# Patient Record
Sex: FEMALE | Race: WHITE | Employment: OTHER | URBAN - METROPOLITAN AREA
[De-identification: names, ages, dates, MRNs, and addresses within clinical notes are randomized per-mention and may not be internally consistent; named-entity substitution may affect disease eponyms.]

---

## 2017-06-02 ENCOUNTER — HOSPITAL ENCOUNTER (OUTPATIENT)
Dept: PREADMISSION TESTING | Age: 72
Discharge: HOME | End: 2017-06-02
Attending: ORTHOPAEDIC SURGERY
Payer: COMMERCIAL

## 2017-06-02 DIAGNOSIS — M17.12 DEGENERATIVE ARTHRITIS OF LEFT KNEE: ICD-10-CM

## 2017-06-02 DIAGNOSIS — Z01.812 BLOOD TESTS PRIOR TO TREATMENT OR PROCEDURE: ICD-10-CM

## 2017-06-02 LAB
ALBUMIN SERPL BCP-MCNC: 4 G/DL (ref 3.4–5)
ALBUMIN/GLOB SERPL: 1.3 {RATIO} (ref 0.8–1.7)
ALP SERPL-CCNC: 71 U/L (ref 45–117)
ALT SERPL-CCNC: 41 U/L (ref 13–56)
ANION GAP BLD CALC-SCNC: 11 MMOL/L (ref 3–18)
APPEARANCE UR: CLEAR
APTT PPP: 32.6 SEC (ref 23–36.4)
AST SERPL W P-5'-P-CCNC: 24 U/L (ref 15–37)
ATRIAL RATE: 81 BPM
BACTERIA SPEC CULT: NORMAL
BACTERIA URNS QL MICRO: ABNORMAL /HPF
BASOPHILS # BLD AUTO: 0 K/UL (ref 0–0.06)
BASOPHILS # BLD: 0 % (ref 0–2)
BILIRUB SERPL-MCNC: 1.3 MG/DL (ref 0.2–1)
BILIRUB UR QL: NEGATIVE
BUN SERPL-MCNC: 16 MG/DL (ref 7–18)
BUN/CREAT SERPL: 18 (ref 12–20)
CALCIUM SERPL-MCNC: 9.2 MG/DL (ref 8.5–10.1)
CALCULATED P AXIS, ECG09: 44 DEGREES
CALCULATED R AXIS, ECG10: 56 DEGREES
CALCULATED T AXIS, ECG11: 48 DEGREES
CHLORIDE SERPL-SCNC: 103 MMOL/L (ref 100–108)
CO2 SERPL-SCNC: 26 MMOL/L (ref 21–32)
COLOR UR: YELLOW
CREAT SERPL-MCNC: 0.9 MG/DL (ref 0.6–1.3)
DIAGNOSIS, 93000: NORMAL
DIFFERENTIAL METHOD BLD: ABNORMAL
EOSINOPHIL # BLD: 0.2 K/UL (ref 0–0.4)
EOSINOPHIL NFR BLD: 2 % (ref 0–5)
EPITH CASTS URNS QL MICRO: ABNORMAL /LPF (ref 0–5)
ERYTHROCYTE [DISTWIDTH] IN BLOOD BY AUTOMATED COUNT: 13 % (ref 11.6–14.5)
ERYTHROCYTE [SEDIMENTATION RATE] IN BLOOD: 11 MM/HR (ref 0–30)
EST. AVERAGE GLUCOSE BLD GHB EST-MCNC: 114 MG/DL
GLOBULIN SER CALC-MCNC: 3.2 G/DL (ref 2–4)
GLUCOSE SERPL-MCNC: 122 MG/DL (ref 74–99)
GLUCOSE UR STRIP.AUTO-MCNC: NEGATIVE MG/DL
HBA1C MFR BLD: 5.6 % (ref 4.5–5.6)
HCT VFR BLD AUTO: 44.2 % (ref 35–45)
HGB BLD-MCNC: 15.3 G/DL (ref 12–16)
HGB UR QL STRIP: NEGATIVE
INR PPP: 1 (ref 0.8–1.2)
KETONES UR QL STRIP.AUTO: NEGATIVE MG/DL
LEUKOCYTE ESTERASE UR QL STRIP.AUTO: ABNORMAL
LYMPHOCYTES # BLD AUTO: 35 % (ref 21–52)
LYMPHOCYTES # BLD: 2.8 K/UL (ref 0.9–3.6)
MCH RBC QN AUTO: 30.7 PG (ref 24–34)
MCHC RBC AUTO-ENTMCNC: 34.6 G/DL (ref 31–37)
MCV RBC AUTO: 88.6 FL (ref 74–97)
MONOCYTES # BLD: 0.6 K/UL (ref 0.05–1.2)
MONOCYTES NFR BLD AUTO: 8 % (ref 3–10)
NEUTS SEG # BLD: 4.4 K/UL (ref 1.8–8)
NEUTS SEG NFR BLD AUTO: 55 % (ref 40–73)
NITRITE UR QL STRIP.AUTO: NEGATIVE
P-R INTERVAL, ECG05: 156 MS
PH UR STRIP: 7 [PH] (ref 5–8)
PLATELET # BLD AUTO: 303 K/UL (ref 135–420)
PMV BLD AUTO: 9 FL (ref 9.2–11.8)
POTASSIUM SERPL-SCNC: 4.3 MMOL/L (ref 3.5–5.5)
PROT SERPL-MCNC: 7.2 G/DL (ref 6.4–8.2)
PROT UR STRIP-MCNC: NEGATIVE MG/DL
PROTHROMBIN TIME: 12.5 SEC (ref 11.5–15.2)
Q-T INTERVAL, ECG07: 366 MS
QRS DURATION, ECG06: 86 MS
QTC CALCULATION (BEZET), ECG08: 425 MS
RBC # BLD AUTO: 4.99 M/UL (ref 4.2–5.3)
RBC #/AREA URNS HPF: ABNORMAL /HPF (ref 0–5)
SERVICE CMNT-IMP: NORMAL
SODIUM SERPL-SCNC: 140 MMOL/L (ref 136–145)
SP GR UR REFRACTOMETRY: 1.02 (ref 1–1.03)
UROBILINOGEN UR QL STRIP.AUTO: 1 EU/DL (ref 0.2–1)
VENTRICULAR RATE, ECG03: 81 BPM
WBC # BLD AUTO: 8 K/UL (ref 4.6–13.2)
WBC URNS QL MICRO: ABNORMAL /HPF (ref 0–5)

## 2017-06-02 PROCEDURE — 80053 COMPREHEN METABOLIC PANEL: CPT | Performed by: ORTHOPAEDIC SURGERY

## 2017-06-02 PROCEDURE — 87641 MR-STAPH DNA AMP PROBE: CPT | Performed by: ORTHOPAEDIC SURGERY

## 2017-06-02 PROCEDURE — 83036 HEMOGLOBIN GLYCOSYLATED A1C: CPT | Performed by: ORTHOPAEDIC SURGERY

## 2017-06-02 PROCEDURE — 93005 ELECTROCARDIOGRAM TRACING: CPT

## 2017-06-02 PROCEDURE — 36415 COLL VENOUS BLD VENIPUNCTURE: CPT | Performed by: ORTHOPAEDIC SURGERY

## 2017-06-02 PROCEDURE — 85652 RBC SED RATE AUTOMATED: CPT | Performed by: ORTHOPAEDIC SURGERY

## 2017-06-02 PROCEDURE — 85730 THROMBOPLASTIN TIME PARTIAL: CPT | Performed by: ORTHOPAEDIC SURGERY

## 2017-06-02 PROCEDURE — 81001 URINALYSIS AUTO W/SCOPE: CPT | Performed by: ORTHOPAEDIC SURGERY

## 2017-06-02 PROCEDURE — 85025 COMPLETE CBC W/AUTO DIFF WBC: CPT | Performed by: ORTHOPAEDIC SURGERY

## 2017-06-02 PROCEDURE — 87086 URINE CULTURE/COLONY COUNT: CPT | Performed by: ORTHOPAEDIC SURGERY

## 2017-06-02 PROCEDURE — 85610 PROTHROMBIN TIME: CPT | Performed by: ORTHOPAEDIC SURGERY

## 2017-06-03 LAB
BACTERIA SPEC CULT: NORMAL
SERVICE CMNT-IMP: NORMAL

## 2017-06-25 NOTE — H&P
[address] [facility] [address]  History and Physical Exam    Patient: Es Schafer MRN: [id number]  SSN: [id number]    YOB: 1945  Age: 66 y.o. Sex: female      Subjective:      Chief Complaint: left knee pain    History of Present Illness:  Patient complains of left knee pain and difficulty ambulating. Past Medical History:   Diagnosis Date    Adverse effect of anesthesia     slow to wake up    Arthritis     Hypertension 2002    Ill-defined condition     cholesterol    Nausea & vomiting     Osteoarthritis of right hip 8/17/2014     Past Surgical History:   Procedure Laterality Date    HX HYSTERECTOMY      partial    TOTAL HIP ARTHROPLASTY  2009    left    TOTAL HIP ARTHROPLASTY Right 2014     Social History     Occupational History    Not on file. Social History Main Topics    Smoking status: Never Smoker    Smokeless tobacco: Never Used    Alcohol use 0.5 oz/week     1 drink(s) per week      Comment: 2 per month    Drug use: No    Sexual activity: Not on file     Prior to Admission medications    Medication Sig Start Date End Date Taking? Authorizing Provider   ibuprofen (ADVIL) 200 mg tablet Take 200 mg by mouth every six (6) hours as needed for Pain. Historical Provider   valsartan-hydrochlorothiazide (DIOVAN HCT) 320-25 mg per tablet Take 1 Tab by mouth daily. Historical Provider   atorvastatin (LIPITOR) 20 mg tablet Take 20 mg by mouth nightly. Historical Provider     Family History: osteoarthritis and hypertension     Allergies: No Known Allergies     Review of Systems:  A comprehensive review of systems was negative except for that written in the History of Present Illness.     Objective:       Physical Exam:  HEENT: Normocephalic, atraumatic  Lungs:  Clear to auscultation  Heart:   Regular rate and rhythm  Abdomen: Soft  Extremities:  Pain with range of motion of the left knee  Neurological: Grossly neurovascularly intact    Assessment: Arthritis of the left knee. Plan:       The patient has failed previous efforts of conservative management to include cortisone injections and viscosupplementation. Due to the fact that conservative efforts failed, the patient became a candidate for surgical intervention. Proceed with scheduled left total knee arthroplasty. The various methods of treatment have been discussed with the patient and family. After consideration of risks, benefits, and other options for treatment, the patient has consented to surgical interventions. Questions were answered and preoperative teaching was done by Dr. Toy Louis.      Signed By: Gabriel Ibarra PA-C     June 25, 2017

## 2017-06-26 ENCOUNTER — APPOINTMENT (OUTPATIENT)
Dept: GENERAL RADIOLOGY | Age: 72
DRG: 470 | End: 2017-06-26
Attending: PHYSICIAN ASSISTANT
Payer: COMMERCIAL

## 2017-06-26 ENCOUNTER — ANESTHESIA (OUTPATIENT)
Dept: SURGERY | Age: 72
DRG: 470 | End: 2017-06-26
Payer: COMMERCIAL

## 2017-06-26 ENCOUNTER — ANESTHESIA EVENT (OUTPATIENT)
Dept: SURGERY | Age: 72
DRG: 470 | End: 2017-06-26
Payer: COMMERCIAL

## 2017-06-26 ENCOUNTER — HOSPITAL ENCOUNTER (INPATIENT)
Age: 72
LOS: 1 days | Discharge: HOME HEALTH | DRG: 470 | End: 2017-06-27
Attending: ORTHOPAEDIC SURGERY | Admitting: ORTHOPAEDIC SURGERY
Payer: COMMERCIAL

## 2017-06-26 PROBLEM — M17.12 OSTEOARTHRITIS OF LEFT KNEE: Status: ACTIVE | Noted: 2017-06-26

## 2017-06-26 LAB
ABO + RH BLD: NORMAL
BLOOD GROUP ANTIBODIES SERPL: NORMAL
SPECIMEN EXP DATE BLD: NORMAL

## 2017-06-26 PROCEDURE — 76060000035 HC ANESTHESIA 2 TO 2.5 HR: Performed by: ORTHOPAEDIC SURGERY

## 2017-06-26 PROCEDURE — 0SRD0JZ REPLACEMENT OF LEFT KNEE JOINT WITH SYNTHETIC SUBSTITUTE, OPEN APPROACH: ICD-10-PCS | Performed by: ORTHOPAEDIC SURGERY

## 2017-06-26 PROCEDURE — 86900 BLOOD TYPING SEROLOGIC ABO: CPT | Performed by: ORTHOPAEDIC SURGERY

## 2017-06-26 PROCEDURE — 77030011640 HC PAD GRND REM COVD -A: Performed by: ORTHOPAEDIC SURGERY

## 2017-06-26 PROCEDURE — 77010033678 HC OXYGEN DAILY

## 2017-06-26 PROCEDURE — 77030020813 HC INST SCULP CEM KT DISP S&N -B: Performed by: ORTHOPAEDIC SURGERY

## 2017-06-26 PROCEDURE — 74011000250 HC RX REV CODE- 250

## 2017-06-26 PROCEDURE — 77030031139 HC SUT VCRL2 J&J -A: Performed by: ORTHOPAEDIC SURGERY

## 2017-06-26 PROCEDURE — 77030000032 HC CUF TRNQT ZIMM -B: Performed by: ORTHOPAEDIC SURGERY

## 2017-06-26 PROCEDURE — 74011250636 HC RX REV CODE- 250/636: Performed by: ORTHOPAEDIC SURGERY

## 2017-06-26 PROCEDURE — 64447 NJX AA&/STRD FEMORAL NRV IMG: CPT | Performed by: ANESTHESIOLOGY

## 2017-06-26 PROCEDURE — 77030033067 HC SUT PDO STRATFX SPIR J&J -B: Performed by: ORTHOPAEDIC SURGERY

## 2017-06-26 PROCEDURE — 77030003666 HC NDL SPINAL BD -A: Performed by: ORTHOPAEDIC SURGERY

## 2017-06-26 PROCEDURE — 77030010785: Performed by: ORTHOPAEDIC SURGERY

## 2017-06-26 PROCEDURE — 97161 PT EVAL LOW COMPLEX 20 MIN: CPT

## 2017-06-26 PROCEDURE — 74011250636 HC RX REV CODE- 250/636

## 2017-06-26 PROCEDURE — 77030037875 HC DRSG MEPILEX <16IN BORD MOLN -A: Performed by: ORTHOPAEDIC SURGERY

## 2017-06-26 PROCEDURE — 77030018846 HC SOL IRR STRL H20 ICUM -A: Performed by: ORTHOPAEDIC SURGERY

## 2017-06-26 PROCEDURE — 36415 COLL VENOUS BLD VENIPUNCTURE: CPT | Performed by: ORTHOPAEDIC SURGERY

## 2017-06-26 PROCEDURE — 74011000258 HC RX REV CODE- 258

## 2017-06-26 PROCEDURE — 77030018836 HC SOL IRR NACL ICUM -A: Performed by: ORTHOPAEDIC SURGERY

## 2017-06-26 PROCEDURE — 76942 ECHO GUIDE FOR BIOPSY: CPT | Performed by: ANESTHESIOLOGY

## 2017-06-26 PROCEDURE — 77030032489 HC SLV COMPR SCD FT CUF COVD -B: Performed by: ORTHOPAEDIC SURGERY

## 2017-06-26 PROCEDURE — 74011000258 HC RX REV CODE- 258: Performed by: PHYSICIAN ASSISTANT

## 2017-06-26 PROCEDURE — 76010000131 HC OR TIME 2 TO 2.5 HR: Performed by: ORTHOPAEDIC SURGERY

## 2017-06-26 PROCEDURE — 77030011264 HC ELECTRD BLD EXT COVD -A: Performed by: ORTHOPAEDIC SURGERY

## 2017-06-26 PROCEDURE — 77030018835 HC SOL IRR LR ICUM -A: Performed by: ORTHOPAEDIC SURGERY

## 2017-06-26 PROCEDURE — 74011000250 HC RX REV CODE- 250: Performed by: ORTHOPAEDIC SURGERY

## 2017-06-26 PROCEDURE — 74011250637 HC RX REV CODE- 250/637: Performed by: ANESTHESIOLOGY

## 2017-06-26 PROCEDURE — 77030016060 HC NDL NRV BLK TELE -A: Performed by: ANESTHESIOLOGY

## 2017-06-26 PROCEDURE — 74011000258 HC RX REV CODE- 258: Performed by: ORTHOPAEDIC SURGERY

## 2017-06-26 PROCEDURE — 77030013708 HC HNDPC SUC IRR PULS STRY –B: Performed by: ORTHOPAEDIC SURGERY

## 2017-06-26 PROCEDURE — 65270000029 HC RM PRIVATE

## 2017-06-26 PROCEDURE — 74011250637 HC RX REV CODE- 250/637: Performed by: PHYSICIAN ASSISTANT

## 2017-06-26 PROCEDURE — 77030020782 HC GWN BAIR PAWS FLX 3M -B: Performed by: ORTHOPAEDIC SURGERY

## 2017-06-26 PROCEDURE — 77030036563 HC WRP CLD THER KNE S2SG -B: Performed by: ORTHOPAEDIC SURGERY

## 2017-06-26 PROCEDURE — 77030034479 HC ADH SKN CLSR PRINEO J&J -B: Performed by: ORTHOPAEDIC SURGERY

## 2017-06-26 PROCEDURE — 76210000006 HC OR PH I REC 0.5 TO 1 HR: Performed by: ORTHOPAEDIC SURGERY

## 2017-06-26 PROCEDURE — 77030035643 HC BLD SAW OSC PRECIS STRY -C: Performed by: ORTHOPAEDIC SURGERY

## 2017-06-26 PROCEDURE — C1776 JOINT DEVICE (IMPLANTABLE): HCPCS | Performed by: ORTHOPAEDIC SURGERY

## 2017-06-26 PROCEDURE — 74011250636 HC RX REV CODE- 250/636: Performed by: PHYSICIAN ASSISTANT

## 2017-06-26 PROCEDURE — 3E0T3CZ INTRODUCE REGIONAL ANESTH IN PERIPH NRV, PLEXI, PERC: ICD-10-PCS | Performed by: ANESTHESIOLOGY

## 2017-06-26 PROCEDURE — 74011000250 HC RX REV CODE- 250: Performed by: PHYSICIAN ASSISTANT

## 2017-06-26 PROCEDURE — 73560 X-RAY EXAM OF KNEE 1 OR 2: CPT

## 2017-06-26 PROCEDURE — 77030012508 HC MSK AIRWY LMA AMBU -A: Performed by: ANESTHESIOLOGY

## 2017-06-26 PROCEDURE — 74011250636 HC RX REV CODE- 250/636: Performed by: ANESTHESIOLOGY

## 2017-06-26 PROCEDURE — C9290 INJ, BUPIVACAINE LIPOSOME: HCPCS | Performed by: ORTHOPAEDIC SURGERY

## 2017-06-26 PROCEDURE — 77030002933 HC SUT MCRYL J&J -A: Performed by: ORTHOPAEDIC SURGERY

## 2017-06-26 DEVICE — PAT ASYM MTL-BK 11MM SZ A38 -- TRIATHLON: Type: IMPLANTABLE DEVICE | Site: KNEE | Status: FUNCTIONAL

## 2017-06-26 DEVICE — COMPONENT TOT KNEE HYBRID POROUS X3 TRIATHLON: Type: IMPLANTABLE DEVICE | Site: KNEE | Status: FUNCTIONAL

## 2017-06-26 DEVICE — INSERT TIB ARTC BRNG SZ 4 9MM -- TRIATHLON X3: Type: IMPLANTABLE DEVICE | Site: KNEE | Status: FUNCTIONAL

## 2017-06-26 DEVICE — COMPNT FEM CR TRIATHLN 4 L PA --: Type: IMPLANTABLE DEVICE | Site: KNEE | Status: FUNCTIONAL

## 2017-06-26 DEVICE — BASEPLATE TIB SZ 4 AP46MM ML70MM KNEE TRITANIUM 4 CRUCFRM: Type: IMPLANTABLE DEVICE | Site: KNEE | Status: FUNCTIONAL

## 2017-06-26 RX ORDER — CEFAZOLIN SODIUM 2 G/50ML
2 SOLUTION INTRAVENOUS ONCE
Status: COMPLETED | OUTPATIENT
Start: 2017-06-26 | End: 2017-06-26

## 2017-06-26 RX ORDER — SODIUM CHLORIDE 0.9 % (FLUSH) 0.9 %
5-10 SYRINGE (ML) INJECTION AS NEEDED
Status: DISCONTINUED | OUTPATIENT
Start: 2017-06-26 | End: 2017-06-26 | Stop reason: HOSPADM

## 2017-06-26 RX ORDER — ACETAMINOPHEN 325 MG/1
650 TABLET ORAL EVERY 6 HOURS
Status: DISCONTINUED | OUTPATIENT
Start: 2017-06-27 | End: 2017-06-27 | Stop reason: HOSPADM

## 2017-06-26 RX ORDER — HYDROMORPHONE HYDROCHLORIDE 1 MG/ML
0.5 INJECTION, SOLUTION INTRAMUSCULAR; INTRAVENOUS; SUBCUTANEOUS
Status: DISCONTINUED | OUTPATIENT
Start: 2017-06-26 | End: 2017-06-26 | Stop reason: HOSPADM

## 2017-06-26 RX ORDER — OXYCODONE HYDROCHLORIDE 5 MG/1
5-10 TABLET ORAL
Status: DISCONTINUED | OUTPATIENT
Start: 2017-06-26 | End: 2017-06-27 | Stop reason: HOSPADM

## 2017-06-26 RX ORDER — ATORVASTATIN CALCIUM 20 MG/1
20 TABLET, FILM COATED ORAL
Status: DISCONTINUED | OUTPATIENT
Start: 2017-06-26 | End: 2017-06-27 | Stop reason: HOSPADM

## 2017-06-26 RX ORDER — ONDANSETRON 2 MG/ML
INJECTION INTRAMUSCULAR; INTRAVENOUS AS NEEDED
Status: DISCONTINUED | OUTPATIENT
Start: 2017-06-26 | End: 2017-06-26 | Stop reason: HOSPADM

## 2017-06-26 RX ORDER — OXYCODONE HYDROCHLORIDE 5 MG/1
5 TABLET ORAL ONCE
Status: DISCONTINUED | OUTPATIENT
Start: 2017-06-26 | End: 2017-06-26

## 2017-06-26 RX ORDER — FENTANYL CITRATE 50 UG/ML
INJECTION, SOLUTION INTRAMUSCULAR; INTRAVENOUS AS NEEDED
Status: DISCONTINUED | OUTPATIENT
Start: 2017-06-26 | End: 2017-06-26 | Stop reason: HOSPADM

## 2017-06-26 RX ORDER — SODIUM CHLORIDE, SODIUM LACTATE, POTASSIUM CHLORIDE, CALCIUM CHLORIDE 600; 310; 30; 20 MG/100ML; MG/100ML; MG/100ML; MG/100ML
75 INJECTION, SOLUTION INTRAVENOUS CONTINUOUS
Status: DISCONTINUED | OUTPATIENT
Start: 2017-06-26 | End: 2017-06-27 | Stop reason: HOSPADM

## 2017-06-26 RX ORDER — SODIUM CHLORIDE, SODIUM LACTATE, POTASSIUM CHLORIDE, CALCIUM CHLORIDE 600; 310; 30; 20 MG/100ML; MG/100ML; MG/100ML; MG/100ML
125 INJECTION, SOLUTION INTRAVENOUS CONTINUOUS
Status: DISCONTINUED | OUTPATIENT
Start: 2017-06-26 | End: 2017-06-26 | Stop reason: HOSPADM

## 2017-06-26 RX ORDER — HYDROMORPHONE HYDROCHLORIDE 1 MG/ML
INJECTION, SOLUTION INTRAMUSCULAR; INTRAVENOUS; SUBCUTANEOUS AS NEEDED
Status: DISCONTINUED | OUTPATIENT
Start: 2017-06-26 | End: 2017-06-26 | Stop reason: HOSPADM

## 2017-06-26 RX ORDER — PROPOFOL 10 MG/ML
INJECTION, EMULSION INTRAVENOUS AS NEEDED
Status: DISCONTINUED | OUTPATIENT
Start: 2017-06-26 | End: 2017-06-26 | Stop reason: HOSPADM

## 2017-06-26 RX ORDER — DOCUSATE SODIUM 100 MG/1
100 CAPSULE, LIQUID FILLED ORAL 2 TIMES DAILY
Status: DISCONTINUED | OUTPATIENT
Start: 2017-06-26 | End: 2017-06-27 | Stop reason: HOSPADM

## 2017-06-26 RX ORDER — GLYCOPYRROLATE 0.2 MG/ML
INJECTION INTRAMUSCULAR; INTRAVENOUS AS NEEDED
Status: DISCONTINUED | OUTPATIENT
Start: 2017-06-26 | End: 2017-06-26 | Stop reason: HOSPADM

## 2017-06-26 RX ORDER — ROPIVACAINE HYDROCHLORIDE 5 MG/ML
INJECTION, SOLUTION EPIDURAL; INFILTRATION; PERINEURAL AS NEEDED
Status: DISCONTINUED | OUTPATIENT
Start: 2017-06-26 | End: 2017-06-26 | Stop reason: HOSPADM

## 2017-06-26 RX ORDER — SODIUM CHLORIDE 0.9 % (FLUSH) 0.9 %
5-10 SYRINGE (ML) INJECTION AS NEEDED
Status: DISCONTINUED | OUTPATIENT
Start: 2017-06-26 | End: 2017-06-27 | Stop reason: HOSPADM

## 2017-06-26 RX ORDER — LORAZEPAM 2 MG/ML
1 INJECTION INTRAMUSCULAR
Status: DISCONTINUED | OUTPATIENT
Start: 2017-06-26 | End: 2017-06-27 | Stop reason: HOSPADM

## 2017-06-26 RX ORDER — ACETAMINOPHEN 10 MG/ML
1000 INJECTION, SOLUTION INTRAVENOUS ONCE
Status: COMPLETED | OUTPATIENT
Start: 2017-06-26 | End: 2017-06-26

## 2017-06-26 RX ORDER — CELECOXIB 100 MG/1
400 CAPSULE ORAL
Status: COMPLETED | OUTPATIENT
Start: 2017-06-26 | End: 2017-06-26

## 2017-06-26 RX ORDER — EPHEDRINE SULFATE/0.9% NACL/PF 25 MG/5 ML
SYRINGE (ML) INTRAVENOUS AS NEEDED
Status: DISCONTINUED | OUTPATIENT
Start: 2017-06-26 | End: 2017-06-26 | Stop reason: HOSPADM

## 2017-06-26 RX ORDER — HYDROMORPHONE HYDROCHLORIDE 1 MG/ML
1 INJECTION, SOLUTION INTRAMUSCULAR; INTRAVENOUS; SUBCUTANEOUS
Status: DISCONTINUED | OUTPATIENT
Start: 2017-06-26 | End: 2017-06-27 | Stop reason: HOSPADM

## 2017-06-26 RX ORDER — ONDANSETRON 2 MG/ML
INJECTION INTRAMUSCULAR; INTRAVENOUS
Status: COMPLETED
Start: 2017-06-26 | End: 2017-06-26

## 2017-06-26 RX ORDER — ONDANSETRON 2 MG/ML
4 INJECTION INTRAMUSCULAR; INTRAVENOUS
Status: DISCONTINUED | OUTPATIENT
Start: 2017-06-26 | End: 2017-06-27 | Stop reason: HOSPADM

## 2017-06-26 RX ORDER — SODIUM CHLORIDE 0.9 % (FLUSH) 0.9 %
5-10 SYRINGE (ML) INJECTION EVERY 8 HOURS
Status: DISCONTINUED | OUTPATIENT
Start: 2017-06-26 | End: 2017-06-27 | Stop reason: HOSPADM

## 2017-06-26 RX ORDER — MIDAZOLAM HYDROCHLORIDE 1 MG/ML
INJECTION, SOLUTION INTRAMUSCULAR; INTRAVENOUS AS NEEDED
Status: DISCONTINUED | OUTPATIENT
Start: 2017-06-26 | End: 2017-06-26 | Stop reason: HOSPADM

## 2017-06-26 RX ORDER — ACETAMINOPHEN 10 MG/ML
1000 INJECTION, SOLUTION INTRAVENOUS EVERY 8 HOURS
Status: COMPLETED | OUTPATIENT
Start: 2017-06-26 | End: 2017-06-27

## 2017-06-26 RX ORDER — SODIUM CHLORIDE, SODIUM LACTATE, POTASSIUM CHLORIDE, CALCIUM CHLORIDE 600; 310; 30; 20 MG/100ML; MG/100ML; MG/100ML; MG/100ML
125 INJECTION, SOLUTION INTRAVENOUS CONTINUOUS
Status: DISCONTINUED | OUTPATIENT
Start: 2017-06-26 | End: 2017-06-27 | Stop reason: HOSPADM

## 2017-06-26 RX ORDER — NALOXONE HYDROCHLORIDE 0.4 MG/ML
0.4 INJECTION, SOLUTION INTRAMUSCULAR; INTRAVENOUS; SUBCUTANEOUS AS NEEDED
Status: DISCONTINUED | OUTPATIENT
Start: 2017-06-26 | End: 2017-06-27 | Stop reason: HOSPADM

## 2017-06-26 RX ORDER — VALSARTAN 160 MG/1
320 TABLET ORAL DAILY
Status: DISCONTINUED | OUTPATIENT
Start: 2017-06-27 | End: 2017-06-27 | Stop reason: HOSPADM

## 2017-06-26 RX ORDER — HYDROCHLOROTHIAZIDE 25 MG/1
25 TABLET ORAL DAILY
Status: DISCONTINUED | OUTPATIENT
Start: 2017-06-27 | End: 2017-06-27 | Stop reason: HOSPADM

## 2017-06-26 RX ORDER — LIDOCAINE HYDROCHLORIDE 20 MG/ML
INJECTION, SOLUTION EPIDURAL; INFILTRATION; INTRACAUDAL; PERINEURAL AS NEEDED
Status: DISCONTINUED | OUTPATIENT
Start: 2017-06-26 | End: 2017-06-26 | Stop reason: HOSPADM

## 2017-06-26 RX ORDER — LANOLIN ALCOHOL/MO/W.PET/CERES
1 CREAM (GRAM) TOPICAL 3 TIMES DAILY
Status: DISCONTINUED | OUTPATIENT
Start: 2017-06-26 | End: 2017-06-27 | Stop reason: HOSPADM

## 2017-06-26 RX ORDER — CEFAZOLIN SODIUM 2 G/50ML
2 SOLUTION INTRAVENOUS EVERY 8 HOURS
Status: COMPLETED | OUTPATIENT
Start: 2017-06-26 | End: 2017-06-27

## 2017-06-26 RX ORDER — PREGABALIN 75 MG/1
75 CAPSULE ORAL
Status: COMPLETED | OUTPATIENT
Start: 2017-06-26 | End: 2017-06-26

## 2017-06-26 RX ORDER — DIPHENHYDRAMINE HCL 25 MG
25 CAPSULE ORAL
Status: DISCONTINUED | OUTPATIENT
Start: 2017-06-26 | End: 2017-06-27 | Stop reason: HOSPADM

## 2017-06-26 RX ORDER — ASPIRIN 81 MG/1
81 TABLET ORAL 2 TIMES DAILY
Status: DISCONTINUED | OUTPATIENT
Start: 2017-06-26 | End: 2017-06-27 | Stop reason: HOSPADM

## 2017-06-26 RX ADMIN — CEFAZOLIN SODIUM 2 G: 2 SOLUTION INTRAVENOUS at 22:09

## 2017-06-26 RX ADMIN — FENTANYL CITRATE 25 MCG: 50 INJECTION, SOLUTION INTRAMUSCULAR; INTRAVENOUS at 14:14

## 2017-06-26 RX ADMIN — FENTANYL CITRATE 25 MCG: 50 INJECTION, SOLUTION INTRAMUSCULAR; INTRAVENOUS at 14:09

## 2017-06-26 RX ADMIN — ASPIRIN 81 MG: 81 TABLET, COATED ORAL at 21:16

## 2017-06-26 RX ADMIN — FENTANYL CITRATE 100 MCG: 50 INJECTION, SOLUTION INTRAMUSCULAR; INTRAVENOUS at 12:35

## 2017-06-26 RX ADMIN — ACETAMINOPHEN 1000 MG: 10 INJECTION, SOLUTION INTRAVENOUS at 14:06

## 2017-06-26 RX ADMIN — MIDAZOLAM HYDROCHLORIDE 2 MG: 1 INJECTION, SOLUTION INTRAMUSCULAR; INTRAVENOUS at 13:37

## 2017-06-26 RX ADMIN — PREGABALIN 75 MG: 75 CAPSULE ORAL at 12:09

## 2017-06-26 RX ADMIN — FENTANYL CITRATE 25 MCG: 50 INJECTION, SOLUTION INTRAMUSCULAR; INTRAVENOUS at 14:37

## 2017-06-26 RX ADMIN — HYDROMORPHONE HYDROCHLORIDE 0.2 MG: 1 INJECTION, SOLUTION INTRAMUSCULAR; INTRAVENOUS; SUBCUTANEOUS at 15:58

## 2017-06-26 RX ADMIN — CEFAZOLIN SODIUM 2 G: 2 SOLUTION INTRAVENOUS at 13:59

## 2017-06-26 RX ADMIN — SODIUM CHLORIDE 1 G: 900 INJECTION, SOLUTION INTRAVENOUS at 13:50

## 2017-06-26 RX ADMIN — ONDANSETRON 4 MG: 2 INJECTION INTRAMUSCULAR; INTRAVENOUS at 13:40

## 2017-06-26 RX ADMIN — PROPOFOL 150 MG: 10 INJECTION, EMULSION INTRAVENOUS at 13:43

## 2017-06-26 RX ADMIN — HYDROMORPHONE HYDROCHLORIDE 0.2 MG: 1 INJECTION, SOLUTION INTRAMUSCULAR; INTRAVENOUS; SUBCUTANEOUS at 15:32

## 2017-06-26 RX ADMIN — ONDANSETRON HYDROCHLORIDE 4 MG: 2 INJECTION INTRAMUSCULAR; INTRAVENOUS at 17:44

## 2017-06-26 RX ADMIN — FENTANYL CITRATE 50 MCG: 50 INJECTION, SOLUTION INTRAMUSCULAR; INTRAVENOUS at 15:38

## 2017-06-26 RX ADMIN — FENTANYL CITRATE 25 MCG: 50 INJECTION, SOLUTION INTRAMUSCULAR; INTRAVENOUS at 14:20

## 2017-06-26 RX ADMIN — SODIUM CHLORIDE, SODIUM LACTATE, POTASSIUM CHLORIDE, AND CALCIUM CHLORIDE: 600; 310; 30; 20 INJECTION, SOLUTION INTRAVENOUS at 15:52

## 2017-06-26 RX ADMIN — GLYCOPYRROLATE 0.2 MG: 0.2 INJECTION INTRAMUSCULAR; INTRAVENOUS at 13:40

## 2017-06-26 RX ADMIN — SODIUM CHLORIDE, SODIUM LACTATE, POTASSIUM CHLORIDE, AND CALCIUM CHLORIDE: 600; 310; 30; 20 INJECTION, SOLUTION INTRAVENOUS at 14:21

## 2017-06-26 RX ADMIN — SODIUM CHLORIDE 1 G: 900 INJECTION, SOLUTION INTRAVENOUS at 15:18

## 2017-06-26 RX ADMIN — Medication 5 MG: at 15:45

## 2017-06-26 RX ADMIN — LIDOCAINE HYDROCHLORIDE 60 MG: 20 INJECTION, SOLUTION EPIDURAL; INFILTRATION; INTRACAUDAL; PERINEURAL at 13:43

## 2017-06-26 RX ADMIN — ATORVASTATIN CALCIUM 20 MG: 20 TABLET, FILM COATED ORAL at 21:15

## 2017-06-26 RX ADMIN — ONDANSETRON 4 MG: 2 INJECTION INTRAMUSCULAR; INTRAVENOUS at 22:10

## 2017-06-26 RX ADMIN — DOCUSATE SODIUM 100 MG: 100 CAPSULE, LIQUID FILLED ORAL at 21:16

## 2017-06-26 RX ADMIN — ROPIVACAINE HYDROCHLORIDE 20 ML: 5 INJECTION, SOLUTION EPIDURAL; INFILTRATION; PERINEURAL at 12:40

## 2017-06-26 RX ADMIN — CELECOXIB 400 MG: 100 CAPSULE ORAL at 12:09

## 2017-06-26 RX ADMIN — Medication 10 MG: at 13:58

## 2017-06-26 RX ADMIN — HYDROMORPHONE HYDROCHLORIDE 0.3 MG: 1 INJECTION, SOLUTION INTRAMUSCULAR; INTRAVENOUS; SUBCUTANEOUS at 15:51

## 2017-06-26 RX ADMIN — HYDROMORPHONE HYDROCHLORIDE 0.3 MG: 1 INJECTION, SOLUTION INTRAMUSCULAR; INTRAVENOUS; SUBCUTANEOUS at 16:07

## 2017-06-26 RX ADMIN — FENTANYL CITRATE 25 MCG: 50 INJECTION, SOLUTION INTRAMUSCULAR; INTRAVENOUS at 14:01

## 2017-06-26 RX ADMIN — MIDAZOLAM HYDROCHLORIDE 2 MG: 1 INJECTION, SOLUTION INTRAMUSCULAR; INTRAVENOUS at 12:35

## 2017-06-26 RX ADMIN — SODIUM CHLORIDE, SODIUM LACTATE, POTASSIUM CHLORIDE, AND CALCIUM CHLORIDE 75 ML/HR: 600; 310; 30; 20 INJECTION, SOLUTION INTRAVENOUS at 19:11

## 2017-06-26 RX ADMIN — Medication 10 MG: at 13:50

## 2017-06-26 RX ADMIN — SODIUM CHLORIDE, SODIUM LACTATE, POTASSIUM CHLORIDE, AND CALCIUM CHLORIDE 125 ML/HR: 600; 310; 30; 20 INJECTION, SOLUTION INTRAVENOUS at 09:51

## 2017-06-26 RX ADMIN — Medication 5 MG: at 14:02

## 2017-06-26 RX ADMIN — FENTANYL CITRATE 25 MCG: 50 INJECTION, SOLUTION INTRAMUSCULAR; INTRAVENOUS at 14:27

## 2017-06-26 RX ADMIN — FERROUS SULFATE TAB 325 MG (65 MG ELEMENTAL FE) 325 MG: 325 (65 FE) TAB at 21:16

## 2017-06-26 RX ADMIN — FENTANYL CITRATE 25 MCG: 50 INJECTION, SOLUTION INTRAMUSCULAR; INTRAVENOUS at 15:07

## 2017-06-26 RX ADMIN — Medication 5 MG: at 14:58

## 2017-06-26 RX ADMIN — FENTANYL CITRATE 25 MCG: 50 INJECTION, SOLUTION INTRAMUSCULAR; INTRAVENOUS at 14:32

## 2017-06-26 RX ADMIN — ACETAMINOPHEN 1000 MG: 10 INJECTION, SOLUTION INTRAVENOUS at 21:14

## 2017-06-26 NOTE — ANESTHESIA PREPROCEDURE EVALUATION
Anesthetic History     PONV          Review of Systems / Medical History  Patient summary reviewed, nursing notes reviewed and pertinent labs reviewed    Pulmonary  Within defined limits                 Neuro/Psych   Within defined limits           Cardiovascular    Hypertension              Exercise tolerance: >4 METS     GI/Hepatic/Renal  Within defined limits              Endo/Other        Arthritis     Other Findings              Physical Exam    Airway  Mallampati: III  TM Distance: 4 - 6 cm  Neck ROM: normal range of motion   Mouth opening: Normal     Cardiovascular  Regular rate and rhythm,  S1 and S2 normal,  no murmur, click, rub, or gallop  Rhythm: regular  Rate: normal         Dental    Dentition: Full upper dentures and Full lower dentures     Pulmonary  Breath sounds clear to auscultation               Abdominal  GI exam deferred       Other Findings            Anesthetic Plan    ASA: 3  Anesthesia type: general      Post-op pain plan if not by surgeon: peripheral nerve block single    Induction: Intravenous  Anesthetic plan and risks discussed with: Patient and Spouse

## 2017-06-26 NOTE — ROUTINE PROCESS
TRANSFER - IN REPORT:    Verbal report received from Jade Osuna RN(name) on Judith Kang  being received from PACU(unit) for routine post - op      Report consisted of patients Situation, Background, Assessment and   Recommendations(SBAR). Information from the following report(s) SBAR, Kardex, OR Summary, Procedure Summary, Intake/Output, MAR, Med Rec Status and Cardiac Rhythm NSR was reviewed with the receiving nurse. Opportunity for questions and clarification was provided.

## 2017-06-26 NOTE — OP NOTES
[address] [address]   Total Left Knee Arthroplasty          Date of Surgery: 6/26/2017   Preoperative Diagnosis: OSTEOARTHRITIS LEFT KNEE   Postoperative Diagnosis: OSTEOARTHRITIS LEFT KNEE   Location: [facility]  Surgeon: Nishant Escobar MD  Assistant:   Nacho ZULUAGA  Anesthesia: General and Adductor Canal Block    Procedure: Total Left Knee Arthroplasty    Findings:  Degenerative joint disease of the left knee. Estimated Blood Loss:  150 cc    Specimens: None    Implants:   Implant Name Type Inv. Item Serial No.  Lot No. LRB No. Used Action   BASEPLT TIB PC TRITNM SZ 4 -- TRIATHLON - [id number]  BASEPLT TIB PC TRITNM SZ 4 -- TRIATHLON  [employer] HOW [id number] Left 1 Implanted   INSERT TIB ARTC BRNG SZ 4 9MM -- TRIATHLON X3 - [id number]  INSERT TIB ARTC BRNG SZ 4 9MM -- TRIATHLON X3  [employer] HOW [id number] Left 1 Implanted   COMPNT FEM CR TRIATHLN 4 L PA --  - [id number]  COMPNT FEM CR TRIATHLN 4 L PA --   [employer] HOWM [id number] Left 1 Implanted   PAT ASYM MTL-BK 11MM SZ A38 -- TRIATHLON - [id number]   PAT ASYM MTL-BK 11MM SZ A38 -- TRIATHLON   [employer] HOWM [id number] Left 1 Implanted       OPERATIVE PROCEDURE IN DETAIL: The patient was taken to the operating room, placed in supine position on the operating table. After satisfactory general anesthesia was established, tourniquet was placed on the left thigh. The left leg was prepped with ChloraPrep and draped in a sterile fashion. A time-out was accomplished. Esmarch bandage was used to exsanguinate the leg. Tourniquet was inflated to 280 mmHg. Anterior midline incision was made, length of approximately 4-1/2 inches. Incision was made through the skin and subcutaneous tissue. Medial parapatellar incision was made. The patella was everted and held with towel clips. The thickness was measured at 23 mm. The preliminary cut was made using the oscillating saw.   The final preparation was not done at this time. Attention was directed to the femur. Osteophytes were removed as they were encountered. Drill hole was made in the depth of the intercondylar notch. This was followed by the intramedullary suyapa with the distal cut guide. The guide was held in place with 3 pins. Remaining jigs were removed and the distal femur was cut at this time. The femur was measured and noted to be the size 4. The multi cut femoral block was placed on the distal femur, held in place with 2 pegs and 2 pins. The , anterior, posterior, posterior chamfer, and anterior chamfer cuts were made at this time. The remaining pins and jig were removed at this time. Bone was removed using an osteotome. Curved osteotome was placed on the back of the distal femur to release any osteophytes and contractures at this time. Attention was directed to the tibia. Any remaining meniscal components were removed. The posterior and lateral retractors were placed. Care being taken to avoid excess pressure on the lateral retractor. The extramedullary tibial guide system was used. It was held in position and adjusted for alignment. The cutting guide was measured at approximately 9 mm off the high side. The cutting block was pinned in place. The remaining jigs were removed. The alignment suyapa was placed on the tibial cutting block to help with alignment. The proximal tibia was cut at this time. The bone was removed. The spacer block was used and was satisfactory in flexion and extension. The tibia was sized and noted to be the size indicated above. The femoral component was impacted into position. The tibial baseplate with the trial poly on it was placed at this time. The trial tibia and trial poly were placed with the knee in extension. The tibial baseplate was fixed with two pins. The knee was placed through a range of motion which was noted to be satisfactory in flexion and extension.   Good stability was noted in both flexion and extension. Attention was directed back to the patella. The patella was again everted a maximum of 10 mm of patella was removed from the initial measurement with the measurement now being 12 mm. The patella was sized and noted to be the size indicated above. The lug holes were drilled at this time. The knee was placed in flexion. The femoral lug holes were drilled. The guide for the tibial finned punch was placed and the finned punch was use at this time. The finned punch was removed and the guide for the tibial pegs was placed. All 4 peg holes were made. Any sclerotic areas were multiply drilled. Pulse lavage irrigation was used at this time. The tibial base plate was impacted into position. The polyethylene component was placed and impacted into position. The femoral component was impacted into position. The knee was placed in extension. The patella was then placed and compressed with a clamp. The knee was checked in flexion and extension for stability in varus and valgus stress. The patella tracked well without the need for lateral release. The tourniquet was deflated. Exparel was placed in the wound edges and the periosteum. The parapatellar incision was closed using interrupted #1 Vicryl figure-of-eight sutures followed by the stratafix bidirectional #2 PDS. The knee was injected with 4mg Morphine, 30 mg toradol, and 30 cc 0.5% marcaine with epinephrine. Subcutaneous tissue was closed using 2-0 Monocryl and the skin was closed with a subcuticular 3-0 Monocryl. The Prineo system was used, followed by a Mepilex dressing. The patient tolerated the procedure well, was awakened from anesthesia, transferred onto the recovery room bed and taken to recovery room in stable condition.      Marquise Chinchilla MD 6/26/2017 7:39 PM

## 2017-06-26 NOTE — PROGRESS NOTES
Problem: Mobility Impaired (Adult and Pediatric)  Goal: *Acute Goals and Plan of Care (Insert Text)  STG (2 days):  1. Pt will be S w/ all bed mobility for d/c.  2. Pt will be SBA w/ all sit<>stand transfers WBAT w/ RW so she can stand to wash her hands at the sink. 3. Pt will be able to demonstrate as well as verbalize understanding of HEP. LTG (5 days):  1. Pt will be able to amb 150ft WBAT w/ RW and SBA 150ft so she can get from her car to her home. 2. Pt will be able to ascend/descend at least 5 steps WBAT w/ CGA and using the R rail for home entry. PHYSICAL THERAPY EVALUATION     Patient: Maggy Davis (13 y.o. female)  Date: 6/26/2017  Primary Diagnosis: OSTEOARTHRITIS LEFT KNEE  Osteoarthritis of left knee  Procedure(s) (LRB):  LEFT TOTAL KNEE REPLACEMENT (Left) Day of Surgery   Precautions:  Fall, WBAT      ASSESSMENT :  Based on the objective data described below, the patient presents with increased pain, decreased bed mobility/transfers, decreased L knee AROM/strength, and decreased ability to amb independently due to recent L TKA. Pt currently stated that she had 0/10 pain on numerical pain scale. Pt was able to transfer from supine to sit where she reported feeling dizzy and nauseous while sitting on the EOB. Pt dry heaved and vomited. Pt required Ronal to transfer from sit to stand and the nerve block was still making her knee weak during stance. Gt training was completed 30ft WBAT w/ RW, GB, and CGA. Pt amb w/ a step through gt cycle and decreased safety awareness. Pt was returned to sitting on the EOB where she again felt dizzy and nauseous and continued to dry heave. A BP was taken while pt was sitting since the dizziness was back and it read 123/75. Pt was left supine in the bed, all needs within reach, SCDs and ice pack applied to L knee. Nurse Linsey aware and is looking into nausea medication. Recommend [facility] after d/c.      Patient will benefit from skilled intervention to address the above impairments. Patients rehabilitation potential is considered to be Good  Factors which may influence rehabilitation potential include:   [ ]         None noted  [ ]         Mental ability/status  [ ]         Medical condition  [ ]         Home/family situation and support systems  [ ]         Safety awareness  [X]         Pain tolerance/management  [ ]         Other:        PLAN :  Recommendations and Planned Interventions:  [X]           Bed Mobility Training             [ ]    Neuromuscular Re-Education  [X]           Transfer Training                   [ ]    Orthotic/Prosthetic Training  [X]           Gait Training                          [ ]    Modalities  [X]           Therapeutic Exercises          [ ]    Edema Management/Control  [X]           Therapeutic Activities            [X]    Patient and Family Training/Education  [ ]           Other (comment):     Frequency/Duration: Patient will be followed by physical therapy twice daily to address goals. Discharge Recommendations: Home Health  Further Equipment Recommendations for Discharge: N/A       SUBJECTIVE:   Patient stated I am very tired.       OBJECTIVE DATA SUMMARY:       Past Medical History:   Diagnosis Date    Adverse effect of anesthesia       slow to wake up    Arthritis      Hypertension 2002    Ill-defined condition       cholesterol    Nausea & vomiting      Osteoarthritis of right hip 8/17/2014     Past Surgical History:   Procedure Laterality Date    HX HYSTERECTOMY         partial    TOTAL HIP ARTHROPLASTY   2009     left    TOTAL HIP ARTHROPLASTY Right 2014     Barriers to Learning/Limitations: None  Compensate with: visual, verbal, tactile, kinesthetic cues/model  Prior Level of Function/Home Situation:   Home Situation  Home Environment: Private residence  # Steps to Enter: 4  Rails to Enter: Yes  Hand Rails : Right  One/Two Story Residence: One story  Living Alone: No  Support Systems: Spouse/Significant Other/Partner  Patient Expects to be Discharged to[de-identified] Private residence  Current DME Used/Available at Home: Wiliam Saucedo, rolling  Critical Behavior:  Neurologic State: Drowsy  Orientation Level: Oriented X4  Cognition: Follows commands; Appropriate for age attention/concentration  Safety/Judgement: Awareness of environment  Psychosocial  Patient Behaviors: Cooperative  Skin Integrity: Incision (comment) (L knee)  Skin Integumentary  Skin Integrity: Incision (comment) (L knee)   Strength:    Strength: Generally decreased, functional  Tone & Sensation:   Tone: Normal  Sensation: Intact  Range Of Motion:  AROM: Generally decreased, functional  Functional Mobility:  Bed Mobility:   Supine to Sit: Stand-by asssistance  Sit to Supine: Stand-by asssistance  Scooting: Stand-by asssistance  Transfers:  Sit to Stand: Minimum assistance (vc)  Stand to Sit: Contact guard assistance (vc)  Balance:   Sitting: Intact  Standing: Intact; With support  Ambulation/Gait Training:  Distance (ft): 30 Feet (ft)  Assistive Device: Walker, rolling;Gait belt  Ambulation - Level of Assistance: Contact guard assistance  Gait Abnormalities: Antalgic;Decreased step clearance   Left Side Weight Bearing: As tolerated  Base of Support: Shift to left  Stance: Left decreased  Speed/Cesilia: Pace decreased (<100 feet/min)  Step Length: Left shortened;Right shortened  Swing Pattern: Right asymmetrical;Left asymmetrical   Interventions: Safety awareness training;Verbal cues  Therapeutic Exercises:   HEP issued per MD protocol. Pain:  Pain Scale 1: Numeric (0 - 10)  Pain Intensity 1: 0  Activity Tolerance:   fair  Please refer to the flowsheet for vital signs taken during this treatment.   After treatment:   [ ]         Patient left in no apparent distress sitting up in chair  [X]         Patient left in no apparent distress in bed  [X]         Call bell left within reach  [X]         Nursing notified  [ ]         Caregiver present  [ ]         Bed alarm activated      COMMUNICATION/EDUCATION:   [X]         Fall prevention education was provided and the patient/caregiver indicated understanding. [X]         Patient/family have participated as able in goal setting and plan of care. [X]         Patient/family agree to work toward stated goals and plan of care. [ ]         Patient understands intent and goals of therapy, but is neutral about his/her participation. [ ]         Patient is unable to participate in goal setting and plan of care.      Thank you for this referral.  Arleen De Guzman   Time Calculation: 20 mins  Eval Complexity: History: LOW Complexity : Zero comorbidities / personal factors that will impact the outcome / POCExam:LOW Complexity : 1-2 Standardized tests and measures addressing body structure, function, activity limitation and / or participation in recreation  Presentation: LOW Complexity : Stable, uncomplicated  Clinical Decision Making:Low Complexity amb -= 30ft Overall Complexity:LOW

## 2017-06-26 NOTE — PROGRESS NOTES
1940 - Bedside report received from Linsey/FELICE Jean. Patient in bed. Pain 0/10.     2120 - Patient in bed at this time. IV to L H  intact and patent. Plexis compression device bilaterally. TEDs to RLE. Dressing to RLE CDI. + CMS. Pt A & O x 4. LS clear, on RA. Abdomen soft, NT and ND. + BS to all 4 quadrants. Denies nausea. Pain 0/10. Call light within reach. 2215-Medications given. Potential side effects explained to patient, patient verbalizes understanding, opportunities for questions provided. Patient stable, No apparent distress at this time, bed in locked position, call bell and phone within reach. 0220-Pt Assisted to the BR to Void. Had N/V after. Zofran given. CHG wipes done. 0640-Pt assisted to the edge of bed. Reports still feeling alittle dizzy. DR Regi Stearns made aware. No new orders. Pt had uneventful shift. Uses IS every hour while awake. Pt ambulated with assistance with a walker. Pain remained well-controlled with medication. No issues/concerns at this time.  Call bell within reach

## 2017-06-26 NOTE — PERIOP NOTES
TRANSFER - OUT REPORT:    Verbal report given to Linsey(name) on Judith Kang  being transferred to (unit) for routine post - op       Report consisted of patients Situation, Background, Assessment and   Recommendations(SBAR). Information from the following report(s) SBAR, Kardex, OR Summary, Procedure Summary, Intake/Output and MAR was reviewed with the receiving nurse. Lines:   Peripheral IV 06/26/17 Left Hand (Active)   Site Assessment Clean, dry, & intact 6/26/2017  4:53 PM   Phlebitis Assessment 0 6/26/2017  4:53 PM   Infiltration Assessment 0 6/26/2017  4:53 PM   Dressing Status Clean, dry, & intact 6/26/2017  4:53 PM   Dressing Type Transparent;Tape 6/26/2017  4:53 PM   Hub Color/Line Status Infusing 6/26/2017  4:53 PM        Opportunity for questions and clarification was provided.       Patient transported with:   Registered Nurse  Tech

## 2017-06-26 NOTE — IP AVS SNAPSHOT
[address] [place] [address] [address] [address] 
[phone] Patient: Crow Cronin MRN: [id number] :1945 You are allergic to the following No active allergies Recent Documentation Height Weight BMI OB Status Smoking Status 1.524 m 100.7 kg 43.38 kg/m2 Hysterectomy Never Smoker Emergency Contacts Name Discharge Info Relation Home Work Mobile Curt Kang DISCHARGE CAREGIVER [3] Spouse [3] [phone] About your hospitalization You were admitted on:  2017 You last received care in the:  [facility] [address] You were discharged on:  2017 Unit phone number:  [phone] Why you were hospitalized Your primary diagnosis was:  Not on File Your diagnoses also included:  Osteoarthritis Of Left Knee Providers Seen During Your Hospitalizations Provider Role Specialty Primary office phone Renaee Collet, MD Attending Provider Orthopedic Surgery [phone] Your Primary Care Physician (PCP) Primary Care Physician Office Phone Office Fax Vera Ibanezond [phone] Follow-up Information Follow up With Details Comments Contact Info Renaee Collet, MD On 2017 Follow up appointment @ 1:05pm [address] [address] 
[phone] Hal Dave MD   [address] [address] 
[phone] [address] to continue managing your healthcare needs. [phone] [facility]  Call on day of discharge for delivery of [phone] Current Discharge Medication List  
  
START taking these medications Dose & Instructions Dispensing Information Comments Morning Noon Evening Bedtime  
 aspirin delayed-release 81 mg tablet Dose:  81 mg Take 1 Tab by mouth two (2) times a day. Quantity:  60 Tab Refills:  0  
     
   
   
6 PM  
   
  
 oxyCODONE-acetaminophen 5-325 mg per tablet Commonly known as:  PERCOCET Dose:  1-2 Tab Take 1-2 Tabs by mouth every four (4) hours as needed for Pain. Max Daily Amount: 12 Tabs. Quantity:  60 Tab Refills:  0  
     
   
   
6 PM  
   
  
 promethazine 12.5 mg tablet Commonly known as:  PHENERGAN Notes to Patient:  Any time Dose:  25 mg Take 2 Tabs by mouth every six (6) hours as needed for Nausea. Quantity:  40 Tab Refills:  0 CONTINUE these medications which have NOT CHANGED Dose & Instructions Dispensing Information Comments Morning Noon Evening Bedtime  
 atorvastatin 20 mg tablet Commonly known as:  LIPITOR Dose:  20 mg Take 20 mg by mouth nightly. Refills:  0  
     
   
   
   
  
 DIOVAN -25 mg per tablet Generic drug:  valsartan-hydroCHLOROthiazide Dose:  1 Tab Take 1 Tab by mouth daily. Refills:  0 STOP taking these medications ADVIL 200 mg tablet Generic drug:  ibuprofen Where to Get Your Medications Information on where to get these meds will be given to you by the nurse or doctor. ! Ask your nurse or doctor about these medications  
  aspirin delayed-release 81 mg tablet  
 oxyCODONE-acetaminophen 5-325 mg per tablet  
 promethazine 12.5 mg tablet Discharge Instructions Total Knee Arthroplasty Discharge Instructions Dr. Marquise Chinchilla Please take the time to review the following instructions before you leave the hospital and use them as guidelines during your recovery from surgery. If you have any questions you may contact my office at [phone]. Wound Care/Dressing Changes: You may change your dressing as needed. Beginning the 2 days after you are discharged from the hospital you should change your dressing daily.   A big, bulky dressing isn't necessary as long as there isn't any drainage from the incisions. You can put a band-aid or Mepilex dressing over the incision and wear ALF hose as needed for comfort and swelling. No dressing is necessary if there is no drainage. It isn't necessary to apply antibiotic ointment to your incisions. Prineo tape will peel off in approximately 2-6 weeks. It does not need to be removed prior to that. When it begins to peel off you can cut the edges away with scissors. Showering/Bathing: You may shower 2 days after surgery. Your dressing may be removed for showering. You may get your incisions wet in the shower. Don't vigorously scrub the area where your incisions are. Apply a clean, dry dressing after drying off the area of your incisions. Don't take a tub bath, get in a swimming pool or Jacuzzi until the incisions are completely healed, which is about 14 days. Do not soak your incisions under water. Weight Bearing Status/Activity: 
       
You may walk as tolerated and perform your normal daily activities. Use a walker or a  
cane only if you need them. You should strive to achieve full range of motion in  
your knee as tolerated. We would like for you to return to your normal activities as soon  
as possible. Ice/Elevation: 
 
Continue ice and elevation as needed for pain and swelling. Diet: 
 
Resume your prehospital diet. If you have excessive nausea or vomitting call your doctor's office. Medications: 
 
 
 
1. You will be given a prescription for pain medications when you are discharged from the hospital.  Take the medication as needed according to the directions on the prescription bottle. Possible side effects of the medication include dizziness, headache, nausea, vomiting, constipation and urinary retention. If you experience any of these side effects call the office so that we can assist you in relieving them.   Discontinue the use of the pain medication if you develop itching, rash, shortness of breath or difficulties swallowing. If these symptoms become severe or aren't relieved by discontinuing the medication you should seek immediate medical attention. Refills of pain medication are authorized during office hours only. (8AM - 5PM Mon thru Fri) 2. If you were prescribed Percocet/oxycodone or Dilaudid/hydromorphone you must have a written prescription. These medications legally CANNOT be called in to a pharmacy. 3. Do not take Tylenol in addition to your pain medication as most of the pain medication already contains Tylenol. Do not exceed 4000 mg of Tylenol per day. Ex:  (hydrocodon 5/500mg = 500 mg of Tylenol) 4. You may resume the medication you were taking prior to your surgery. Pain medication may change the effects of any antidepressant medication. If you have any questions about possible interactions between your regular medications and the pain medication you should consult the physician who prescribes your regular medications. Stool Softeners:   
Pain medications can cause constipation. Stool softeners, warm prune juice and increasing your water and fiber intake can help prevent constipation. Do not take laxatives. Blood Thinner: You will be prescribed 81 mg of aspirin to take twice daily for one month following surgery to prevent blood clots. [facility]: 
Begin In-Home Physical Therapy; 3 times a week to work on gait training, range of motion, strengthening, and weight bearing exercises as tolerable. Home health has been arranged for skilled nursing visits and physical therapy. If no one from the agency calls you on the day after you arrive home, please contact them at the number provided at discharge. Physical Therapy for gait training, joint range of motion and strengthening.   
 
Follow Up Appointment:  
 
Please call [phone] for a follow appointment with Dr. Tc Preston in 10-14 days from the time of your surgery. Please let our office know you are scheduling a post-op appointment. Signs and Symptoms to be Aware of: If any of the following signs and symptoms occur, you should contact Dr. Lawerence Collet office. Please be advised if a problem arises which you feel requires immediate medical attention or you are unable to contact Dr. Lawerence Collet office you should seek immediate medical attention at the emergency department or other health care facility you have access to. Signs and symptoms to watch for include: 1. A sudden increase in swelling and l or redness or warmth at the area your surgery was performed which isn't relieved by rest, ice and elevation. 2 Oral temperature greater than 101.5 degrees for 12 hours or more which isn't relieved by an increase in fluid intake and taking two Tylenol every 4-6 hours. 3 Excessive drainage from your incisions, or drainage which hasn't stopped by 72 hours after your surgery despite applying a compressive dressing, ice and elevation. 4 Calfpain, tenderness, redness or swelling which isn't relieved with rest and elevation. 5 Fever, chills, shortness ofbreath, chest pain, nausea, vomiting or other signs and symptoms which are of concern to you. Other Instructions: 
 
Discharge Orders None [employer] Announcement We are excited to announce that we are making your provider's discharge notes available to you in [employer]. You will see these notes when they are completed and signed by the physician that discharged you from your recent hospital stay. If you have any questions or concerns about any information you see in [employer], please call the Health Information Department where you were seen or reach out to your Primary Care Provider for more information about your plan of care. Introducing [facility]!    
 Jackeline Roman introduces [employer] patient portal. Now you can access parts of your medical record, email your doctor's office, and request medication refills online. 1. In your internet browser, go to [email or web address] 2. Click on the First Time User? Click Here link in the Sign In box. You will see the New Member Sign Up page. 3. Enter your [employer] Access Code exactly as it appears below. You will not need to use this code after youve completed the sign-up process. If you do not sign up before the expiration date, you must request a new code. · [employer] Access Code: [email or web address] Expires: 8/31/2017  7:00 AM 
 
4. Enter the last four digits of your Social Security Number (xxxx) and Date of Birth (mm/dd/yyyy) as indicated and click Submit. You will be taken to the next sign-up page. 5. Create a [employer] ID. This will be your [employer] login ID and cannot be changed, so think of one that is secure and easy to remember. 6. Create a [employer] password. You can change your password at any time. 7. Enter your Password Reset Question and Answer. This can be used at a later time if you forget your password. 8. Enter your e-mail address. You will receive e-mail notification when new information is available in [address] 9. Click Sign Up. You can now view and download portions of your medical record. 10. Click the Download Summary menu link to download a portable copy of your medical information. If you have questions, please visit the Frequently Asked Questions section of the [employer] website. Remember, [employer] is NOT to be used for urgent needs. For medical emergencies, dial 911. Now available from your iPhone and Android! General Information Please provide this summary of care documentation to your next provider. Patient Signature:  ____________________________________________________________ Date:  ____________________________________________________________  
  
Louie Trish  Provider Signature: ____________________________________________________________ Date:  ____________________________________________________________

## 2017-06-26 NOTE — ANESTHESIA POSTPROCEDURE EVALUATION
Post-Anesthesia Evaluation and Assessment    Cardiovascular Function/Vital Signs  Visit Vitals    /65    Pulse 93    Temp 36.1 °C (97 °F)    Resp 14    Ht 5' (1.524 m)    Wt 100.7 kg (222 lb 1.6 oz)    SpO2 97%    BMI 43.38 kg/m2       Patient is status post Procedure(s):  LEFT TOTAL KNEE REPLACEMENT. Nausea/Vomiting: Controlled. Postoperative hydration reviewed and adequate. Pain:  Pain Scale 1: FLACC (06/26/17 1645)  Pain Intensity 1: 0 (06/26/17 1645)   Managed. Neurological Status:   Neuro (WDL): Exceptions to WDL (06/26/17 2299)   At baseline. Mental Status and Level of Consciousness: Arousable. Pulmonary Status:   O2 Device: Nasal cannula (06/26/17 1651)   Adequate oxygenation and airway patent. Complications related to anesthesia: None    Post-anesthesia assessment completed. No concerns. Patient has met all discharge requirements.     Signed By: Ted Lorenzo CRNA    June 26, 2017

## 2017-06-26 NOTE — ANESTHESIA PROCEDURE NOTES
Peripheral Block    Start time: 6/26/2017 12:35 PM  End time: 6/26/2017 12:40 PM  Performed by: Chau Camacho by: Kristen Shaffer       Pre-procedure: Indications: at surgeon's request    Preanesthetic Checklist: patient identified, risks and benefits discussed, site marked, timeout performed, anesthesia consent given and patient being monitored    Timeout Time: 12:35          Block Type:   Block Type: Adductor canal  Laterality:  Left  Monitoring:  Standard ASA monitoring, responsive to questions, continuous pulse ox, oxygen, frequent vital sign checks and heart rate  Injection Technique:  Single shot  Procedures: ultrasound guided    Patient Position: supine  Prep: chlorhexidine    Location:  Mid thigh  Needle Type:  Stimuplex  Needle Gauge:  21 G  Needle Localization:  Ultrasound guidance  Medication Injected:  0.5%  ropivacaine  Volume (mL):  20  Add'l Medication Injected:  1.5%  mepivacaine  Volume (mL):  10    Assessment:  Number of attempts:  1  Injection Assessment:  Incremental injection every 5 mL, negative aspiration for CSF, no paresthesia, local visualized surrounding nerve on ultrasound, negative aspiration for blood, no intravascular symptoms and ultrasound image on chart  Patient tolerance:  Patient tolerated the procedure well with no immediate complications  Procedure and alternatives explained. Risks explained including bleeding, infection, nerve injury, failed block. Procedure explained as elective. Pt understands and desires to proceed. Patient able to straight leg raise  left leg after block. No sensory or motor block.

## 2017-06-26 NOTE — BRIEF OP NOTE
BRIEF OPERATIVE NOTE    Date of Procedure: 6/26/2017   Preoperative Diagnosis: OSTEOARTHRITIS LEFT KNEE  Postoperative Diagnosis: OSTEOARTHRITIS LEFT KNEE    Procedure(s):  LEFT TOTAL KNEE REPLACEMENT  Surgeon(s) and Role:     * Georgina Yang MD - Primary         Assistant Staff: Ekta Deluna PA-C       Surgical Staff:  Circ-1: Sailaja Petersen RN; Naomi Fernando RN  Circ-Relief: Jessika Sanderson RN  Scrub Tech-2: Sinai Zacarias  Scrub RN-1: Edith Case RN  Float Staff: Yanet Schmid RN  Event Time In   Incision Start 1407   Incision Close 1549     Anesthesia: General   Estimated Blood Loss: 150mL  Specimens: * No specimens in log *   Findings: Severe DJD   Complications: None  Implants:   Implant Name Type Inv.  Item Serial No.  Lot No. LRB No. Used Action   BASEPLT TIB PC TRITNM SZ 4 -- TRIATHLON - [id number]  BASEPLT TIB PC TRITNM SZ 4 -- TRIATHLON  [employer] [facility] [id number] Left 1 Implanted   INSERT TIB ARTC BRNG SZ 4 9MM -- TRIATHLON X3 - [id number]  INSERT TIB ARTC BRNG SZ 4 9MM -- TRIATHLON X3  [employer] [facility] [id number] Left 1 Implanted   COMPNT FEM CR TRIATHLN 4 L PA --  - [id number]  COMPNT FEM CR TRIATHLN 4 L PA --   [employer] [facility] [id number] Left 1 Implanted   PAT ASYM MTL-BK 11MM SZ A38 -- TRIATHLON - [id number]   PAT ASYM MTL-BK 11MM SZ A38 -- TRIATHLON   [employer] [facility] [id number] Left 1 Implanted

## 2017-06-26 NOTE — ROUTINE PROCESS
1755 Received client from PACU in satisfactory condition. Client is a pt of Dr. Zeus Cain. Pt had a left Total Knee Replacement today. Client is A/O X 4. Client is calm and cooperative. Positive Posterior Tibial and Dorsalis Pedis pulses. Capillary Refill less than 3 seconds. Skin is warm , dry and skin color is appropriate to race. Bibasilar breath sounds clear. . Bowel sounds  active . Abdomen is soft and non-tender. Client has not voided post-operatively. No bladder distention evident. No complaints of bladder discomfort. Client has ace wrap on left knee and is CDI . Dalton hose applied to rt leg Sequential compression device applied. Client has 18 gauge  Clients pain is  0/10 scale. Client oriented to call bell use as well as bed use. Client oriented to phone and how to order meals. Call bell within reach. Bed in low position. Three side rails up. Shift summary: Patient had uneventful shift. Pain remained well-controlled , complained of nausea, medicated in PACU before. resting quitely.   Pt has not voided yet, informed to oncoming nurse No issues/concerns at this time

## 2017-06-26 NOTE — PERIOP NOTES
TRANSFER - IN REPORT:    Verbal report received from ORN & CRNA on Judith Kang  being received from  OR (unit) for routine post - op      Report consisted of patients Situation, Background, Assessment and   Recommendations(SBAR). Information from the following report(s) SBAR, Intake/Output and MAR was reviewed with the receiving nurse. Opportunity for questions and clarification was provided. Assessment completed upon patients arrival to unit and care assumed.

## 2017-06-27 ENCOUNTER — HOME HEALTH ADMISSION (OUTPATIENT)
Dept: HOME HEALTH SERVICES | Facility: HOME HEALTH | Age: 72
End: 2017-06-27
Payer: COMMERCIAL

## 2017-06-27 VITALS
TEMPERATURE: 98.1 F | WEIGHT: 222.1 LBS | HEART RATE: 86 BPM | HEIGHT: 60 IN | DIASTOLIC BLOOD PRESSURE: 56 MMHG | SYSTOLIC BLOOD PRESSURE: 118 MMHG | OXYGEN SATURATION: 100 % | RESPIRATION RATE: 17 BRPM | BODY MASS INDEX: 43.6 KG/M2

## 2017-06-27 LAB
ANION GAP BLD CALC-SCNC: 10 MMOL/L (ref 3–18)
BUN SERPL-MCNC: 15 MG/DL (ref 7–18)
BUN/CREAT SERPL: 18 (ref 12–20)
CALCIUM SERPL-MCNC: 8.2 MG/DL (ref 8.5–10.1)
CHLORIDE SERPL-SCNC: 103 MMOL/L (ref 100–108)
CO2 SERPL-SCNC: 27 MMOL/L (ref 21–32)
CREAT SERPL-MCNC: 0.84 MG/DL (ref 0.6–1.3)
GLUCOSE SERPL-MCNC: 131 MG/DL (ref 74–99)
HCT VFR BLD AUTO: 35.5 % (ref 35–45)
HGB BLD-MCNC: 11.8 G/DL (ref 12–16)
POTASSIUM SERPL-SCNC: 3.8 MMOL/L (ref 3.5–5.5)
SODIUM SERPL-SCNC: 140 MMOL/L (ref 136–145)

## 2017-06-27 PROCEDURE — 74011250636 HC RX REV CODE- 250/636: Performed by: PHYSICIAN ASSISTANT

## 2017-06-27 PROCEDURE — 74011250636 HC RX REV CODE- 250/636: Performed by: ORTHOPAEDIC SURGERY

## 2017-06-27 PROCEDURE — 80048 BASIC METABOLIC PNL TOTAL CA: CPT | Performed by: PHYSICIAN ASSISTANT

## 2017-06-27 PROCEDURE — 74011250637 HC RX REV CODE- 250/637: Performed by: ORTHOPAEDIC SURGERY

## 2017-06-27 PROCEDURE — 97116 GAIT TRAINING THERAPY: CPT

## 2017-06-27 PROCEDURE — 36415 COLL VENOUS BLD VENIPUNCTURE: CPT | Performed by: PHYSICIAN ASSISTANT

## 2017-06-27 PROCEDURE — 97110 THERAPEUTIC EXERCISES: CPT

## 2017-06-27 PROCEDURE — 74011250637 HC RX REV CODE- 250/637: Performed by: PHYSICIAN ASSISTANT

## 2017-06-27 PROCEDURE — 97165 OT EVAL LOW COMPLEX 30 MIN: CPT

## 2017-06-27 PROCEDURE — 85018 HEMOGLOBIN: CPT | Performed by: PHYSICIAN ASSISTANT

## 2017-06-27 RX ORDER — ASPIRIN 81 MG/1
81 TABLET ORAL 2 TIMES DAILY
Qty: 60 TAB | Refills: 0 | Status: SHIPPED | OUTPATIENT
Start: 2017-06-27

## 2017-06-27 RX ORDER — METOCLOPRAMIDE HYDROCHLORIDE 5 MG/ML
10 INJECTION INTRAMUSCULAR; INTRAVENOUS
Status: DISCONTINUED | OUTPATIENT
Start: 2017-06-27 | End: 2017-06-27 | Stop reason: HOSPADM

## 2017-06-27 RX ORDER — OXYCODONE AND ACETAMINOPHEN 5; 325 MG/1; MG/1
1-2 TABLET ORAL
Qty: 60 TAB | Refills: 0 | Status: SHIPPED | OUTPATIENT
Start: 2017-06-27

## 2017-06-27 RX ORDER — PROMETHAZINE HYDROCHLORIDE 12.5 MG/1
25 TABLET ORAL
Qty: 40 TAB | Refills: 0 | Status: SHIPPED | OUTPATIENT
Start: 2017-06-27

## 2017-06-27 RX ORDER — ADHESIVE BANDAGE
30 BANDAGE TOPICAL DAILY PRN
Status: DISCONTINUED | OUTPATIENT
Start: 2017-06-27 | End: 2017-06-27 | Stop reason: HOSPADM

## 2017-06-27 RX ADMIN — ACETAMINOPHEN 650 MG: 325 TABLET ORAL at 14:03

## 2017-06-27 RX ADMIN — OXYCODONE HYDROCHLORIDE 10 MG: 5 TABLET ORAL at 14:03

## 2017-06-27 RX ADMIN — ACETAMINOPHEN 1000 MG: 10 INJECTION, SOLUTION INTRAVENOUS at 05:10

## 2017-06-27 RX ADMIN — VALSARTAN 320 MG: 160 TABLET ORAL at 09:28

## 2017-06-27 RX ADMIN — HYDROCHLOROTHIAZIDE 25 MG: 25 TABLET ORAL at 09:28

## 2017-06-27 RX ADMIN — ASPIRIN 81 MG: 81 TABLET, COATED ORAL at 09:28

## 2017-06-27 RX ADMIN — METOCLOPRAMIDE 10 MG: 5 INJECTION, SOLUTION INTRAMUSCULAR; INTRAVENOUS at 08:24

## 2017-06-27 RX ADMIN — FERROUS SULFATE TAB 325 MG (65 MG ELEMENTAL FE) 325 MG: 325 (65 FE) TAB at 09:28

## 2017-06-27 RX ADMIN — CEFAZOLIN SODIUM 2 G: 2 SOLUTION INTRAVENOUS at 05:15

## 2017-06-27 RX ADMIN — ONDANSETRON 4 MG: 2 INJECTION INTRAMUSCULAR; INTRAVENOUS at 02:24

## 2017-06-27 RX ADMIN — SODIUM CHLORIDE, SODIUM LACTATE, POTASSIUM CHLORIDE, AND CALCIUM CHLORIDE 125 ML/HR: 600; 310; 30; 20 INJECTION, SOLUTION INTRAVENOUS at 02:25

## 2017-06-27 RX ADMIN — OXYCODONE HYDROCHLORIDE 10 MG: 5 TABLET ORAL at 09:28

## 2017-06-27 RX ADMIN — DOCUSATE SODIUM 100 MG: 100 CAPSULE, LIQUID FILLED ORAL at 09:28

## 2017-06-27 NOTE — PROGRESS NOTES
Problem: Mobility Impaired (Adult and Pediatric)  Goal: *Acute Goals and Plan of Care (Insert Text)  STG (2 days):  1. Pt will be S w/ all bed mobility for d/c.  2. Pt will be SBA w/ all sit<>stand transfers WBAT w/ RW so she can stand to wash her hands at the sink. 3. Pt will be able to demonstrate as well as verbalize understanding of HEP. LTG (5 days):  1. Pt will be able to amb 150ft WBAT w/ RW and SBA 150ft so she can get from her car to her home. 2. Pt will be able to ascend/descend at least 5 steps WBAT w/ CGA and using the R rail for home entry. Outcome: Resolved/Met Date Met:  06/27/17  PHYSICAL THERAPY TREATMENT/DISCHARGE     Patient: Nevaeh Good (74 y.o. female)  Date: 6/27/2017  Diagnosis: OSTEOARTHRITIS LEFT KNEE  Osteoarthritis of left knee <principal problem not specified>  Procedure(s) (LRB):  LEFT TOTAL KNEE REPLACEMENT (Left) 1 Day Post-Op  Precautions: Fall, WBAT  Chart, physical therapy assessment, plan of care and goals were reviewed. ASSESSMENT:  Patient has met short and long term goals at this time. Patient performed sit to/from stand with supervision, ambulated 250 ft with RW, WBAT and supervision, and ascended/descended 4 stairs with left ascending handrails and SBA. Pt has been educated on seated and supine therex to be performed 3x/day at home to improve strength and ROM. Pt is cleared from inpatient physical therapy services at this time and home health physical therapy is recommended upon discharge from hospital.  Progression toward goals:  [X]      Goals met  [ ]      Improving appropriately and progressing toward goals  [ ]      Improving slowly and progressing toward goals  [ ]      Not making progress toward goals and plan of care will be adjusted       PLAN:  Patient will be discharged from physical therapy at this time.   Rationale for discharge:  [X] Goals Achieved  [ ] Almas Momin  [ ] Patient not participating in therapy  [ ] Other:  Discharge Recommendations:  Home Health  Further Equipment Recommendations for Discharge:  N/A       SUBJECTIVE:   Patient stated I am feeling a lot better.       OBJECTIVE DATA SUMMARY:   Critical Behavior:  Neurologic State: Alert, Appropriate for age  Orientation Level: Oriented X4  Cognition: Appropriate decision making, Appropriate for age attention/concentration, Appropriate safety awareness  Safety/Judgement: Awareness of environment, Fall prevention  Functional Mobility Training:  Bed Mobility:  Supine to Sit: Modified independent;Supervision  Sit to Supine: Modified independent;Supervision  Transfers:  Sit to Stand: Supervision  Stand to Sit: Supervision  Balance:  Sitting: Intact  Standing: Intact; With support  Ambulation/Gait Training:  Distance (ft): 250 Feet (ft)  Assistive Device: Gait belt;Walker, rolling  Ambulation - Level of Assistance: Supervision  Gait Abnormalities: Decreased step clearance  Left Side Weight Bearing: As tolerated  Base of Support: Shift to right  Stance: Left decreased  Speed/Cesilia: Pace decreased (<100 feet/min)  Step Length: Right shortened;Left shortened  Stairs:  Number of Stairs Trained: 4  Stairs - Level of Assistance: Stand-by asssistance;Supervision              Rail Use: Left   Therapeutic Exercises:   Reviewed supine and seated there ex, pt demonstrated good form and technique with all exercises  Pain:  Pain Scale 1: Numeric (0 - 10)  Pain Intensity 1: 6  Pain Location 1: Knee  Pain Orientation 1: Left  Pain Description 1: Throbbing  Pain Intervention(s) 1: Medication (see MAR)  Activity Tolerance:   Good  Please refer to the flowsheet for vital signs taken during this treatment.   After treatment:   [ ] Patient left in no apparent distress sitting up in chair  [X] Patient left in no apparent distress in bed  [X] Call bell left within reach  [X] Nursing notified  [ ] Caregiver present  [ ] Bed alarm activated  Delmi Tavarez   Time Calculation: 13 mins

## 2017-06-27 NOTE — ROUTINE PROCESS
Bedside report received from Mirlande Huynh RN. Assumed care of patient. Pt found resting in bed. Call light with in reach.

## 2017-06-27 NOTE — DISCHARGE INSTRUCTIONS
Total Knee Arthroplasty Discharge Instructions           Dr. Andrew Ryan    Please take the time to review the following instructions before you leave the hospital and use them as guidelines during your recovery from surgery. If you have any questions you may contact my office at [phone]. Wound Care/Dressing Changes: You may change your dressing as needed. Beginning the 2 days after you are discharged from the hospital you should change your dressing daily. A big, bulky dressing isn't necessary as long as there isn't any drainage from the incisions. You can put a band-aid or Mepilex dressing over the incision and wear ALF hose as needed for comfort and swelling. No dressing is necessary if there is no drainage. It isn't necessary to apply antibiotic ointment to your incisions. Prineo tape will peel off in approximately 2-6 weeks. It does not need to be removed prior to that. When it begins to peel off you can cut the edges away with scissors. Showering/Bathing:    [x]   You may shower 2 days after surgery. Your dressing may be removed for showering. You may get your incisions wet in the shower. Don't vigorously scrub the area where your incisions are. Apply a clean, dry dressing after drying off the area of your incisions. Don't take a tub bath, get in a swimming pool or Jacuzzi until the incisions are completely healed, which is about 14 days. Do not soak your incisions under water. Weight Bearing Status/Activity:          You may walk as tolerated and perform your normal daily activities. Use a walker or a   cane only if you need them. You should strive to achieve full range of motion in   your knee as tolerated. We would like for you to return to your normal activities as soon   as possible. Ice/Elevation:    Continue ice and elevation as needed for pain and swelling. Diet:    Resume your prehospital diet.  If you have excessive nausea or vomitting call your doctor's office. Medications:        1. You will be given a prescription for pain medications when you are discharged from the hospital.  Take the medication as needed according to the directions on the prescription bottle. Possible side effects of the medication include dizziness, headache, nausea, vomiting, constipation and urinary retention. If you experience any of these side effects call the office so that we can assist you in relieving them. Discontinue the use of the pain medication if you develop itching, rash, shortness of breath or difficulties swallowing. If these symptoms become severe or aren't relieved by discontinuing the medication you should seek immediate medical attention. Refills of pain medication are authorized during office hours only. (8AM - 5PM Mon thru Fri)  2. If you were prescribed Percocet/oxycodone or Dilaudid/hydromorphone you must have a written prescription. These medications legally CANNOT be called in to a pharmacy. 3. Do not take Tylenol in addition to your pain medication as most of the pain medication already contains Tylenol. Do not exceed 4000 mg of Tylenol per day. Ex:  (hydrocodon 5/500mg = 500 mg of Tylenol)  4. You may resume the medication you were taking prior to your surgery. Pain medication may change the effects of any antidepressant medication. If you have any questions about possible interactions between your regular medications and the pain medication you should consult the physician who prescribes your regular medications. Stool Softeners:    Pain medications can cause constipation. Stool softeners, warm prune juice and increasing your water and fiber intake can help prevent constipation. Do not take laxatives. Blood Thinner: You will be prescribed 81 mg of aspirin to take twice daily for one month following surgery to prevent blood clots. .     Home Health:  Begin In-Home Physical Therapy; 3 times a week to work on gait training, range of motion, strengthening, and weight bearing exercises as tolerable. Home health has been arranged for skilled nursing visits and physical therapy. If no one from the agency calls you on the day after you arrive home, please contact them at the number provided at discharge. Physical Therapy for gait training, joint range of motion and strengthening. Follow Up Appointment:     Please call [phone] for a follow appointment with Dr. Sadie Abarca in 10-14 days from the time of your surgery. Please let our office know you are scheduling a post-op appointment. Signs and Symptoms to be Aware of: If any of the following signs and symptoms occur, you should contact Dr. Elfida Canavan office. Please be advised if a problem arises which you feel requires immediate medical attention or you are unable to contact Dr. Elfida Canavan office you should seek immediate medical attention at the emergency department or other health care facility you have access to. Signs and symptoms to watch for include:     1. A sudden increase in swelling and l or redness or warmth at the area your surgery was performed which isn't relieved by rest, ice and elevation. 2 Oral temperature greater than 101.5 degrees for 12 hours or more which isn't relieved by an increase in fluid intake and taking two Tylenol every 4-6 hours. 3 Excessive drainage from your incisions, or drainage which hasn't stopped by 72 hours after your surgery despite applying a compressive dressing, ice and elevation. 4 Calfpain, tenderness, redness or swelling which isn't relieved with rest and elevation. 5 Fever, chills, shortness ofbreath, chest pain, nausea, vomiting or other signs and symptoms which are of concern to you.      Other Instructions:

## 2017-06-27 NOTE — PROGRESS NOTES
Problem: Mobility Impaired (Adult and Pediatric)  Goal: *Acute Goals and Plan of Care (Insert Text)  STG (2 days):  1. Pt will be S w/ all bed mobility for d/c.  2. Pt will be SBA w/ all sit<>stand transfers WBAT w/ RW so she can stand to wash her hands at the sink. 3. Pt will be able to demonstrate as well as verbalize understanding of HEP. LTG (5 days):  1. Pt will be able to amb 150ft WBAT w/ RW and SBA 150ft so she can get from her car to her home. 2. Pt will be able to ascend/descend at least 5 steps WBAT w/ CGA and using the R rail for home entry. Outcome: Progressing Towards Goal  PHYSICAL THERAPY TREATMENT     Patient: Michael Gaming (76 y.o. female)  Date: 6/27/2017  Diagnosis: OSTEOARTHRITIS LEFT KNEE  Osteoarthritis of left knee <principal problem not specified>  Procedure(s) (LRB):  LEFT TOTAL KNEE REPLACEMENT (Left) 1 Day Post-Op  Precautions: Fall, WBAT   Chart, physical therapy assessment, plan of care and goals were reviewed. ASSESSMENT:  Pt tolerated mobility fairly well however reported intermittent lightheadedness and nausea. Pt amb in hallway c/RW, SBA, GB applied, WBAT. Pt tolerated stairs c/single handrail c/CGA for safety. Pt planning to order lunch soon and \"hopefully hold it down ok. \" Plan to see patient after lunch for second session today. Progression toward goals:  [X]      Improving appropriately and progressing toward goals  [ ]      Improving slowly and progressing toward goals  [ ]      Not making progress toward goals and plan of care will be adjusted       PLAN:  Patient continues to benefit from skilled intervention to address the above impairments. Continue treatment per established plan of care. Discharge Recommendations:  Home Health  Further Equipment Recommendations for Discharge:  N/A       SUBJECTIVE:   Patient stated I have still been having some nausea.       OBJECTIVE DATA SUMMARY:   Critical Behavior:  Neurologic State: Alert, Appropriate for age  Orientation Level: Appropriate for age, Oriented X4  Cognition: Appropriate decision making, Appropriate for age attention/concentration, Appropriate safety awareness  Safety/Judgement: Awareness of environment  Functional Mobility Training:  Bed Mobility:  Supine to Sit: Supervision  Sit to Supine: Supervision  Transfers:  Sit to Stand: Contact guard assistance  Stand to Sit: Stand-by asssistance  Balance:  Sitting: Intact  Standing: Intact; With support  Ambulation/Gait Training:  Distance (ft): 150 Feet (ft)  Assistive Device: Gait belt;Walker, rolling  Ambulation - Level of Assistance: Stand-by asssistance  Gait Abnormalities: Decreased step clearance  Left Side Weight Bearing: As tolerated  Base of Support: Shift to left  Stance: Left decreased  Speed/Cesilia: Slow  Step Length: Left shortened;Right shortened  Stairs:  Number of Stairs Trained: 4  Stairs - Level of Assistance: Contact guard assistance  Rail Use: Left   Therapeutic Exercises:   Pt performed supine there ex: heel slides, QS, SAQ, ankle pumps, ham sets, x10 reps ea  Pain:  Pain Scale 1: Numeric (0 - 10)  Pain Intensity 1: 6  Pain Location 1: Knee  Pain Orientation 1: Left  Pain Description 1: Throbbing  Pain Intervention(s) 1: Medication (see MAR)  Activity Tolerance:   Fair  Please refer to the flowsheet for vital signs taken during this treatment.   After treatment:   [ ] Patient left in no apparent distress sitting up in chair  [X] Patient left in no apparent distress in bed  [X] Call bell left within reach  [ ] Nursing notified  [ ] Caregiver present  [ ] Bed alarm activated      Darrion Tavarez   Time Calculation: 28 mins

## 2017-06-27 NOTE — DISCHARGE SUMMARY
Patient: Jassi Cardoza               Sex: female         MRN: [id number]       YOB: 1945      Age:  66 y.o.        LOS:  LOS: 1 day                DOA: 6/26/2017    Discharge Date: 6/27/2017    Admission Diagnoses: OSTEOARTHRITIS LEFT KNEE  Osteoarthritis of left knee    Discharge Diagnoses:    Problem List as of 6/27/2017  Date Reviewed: 6/27/2017          Codes Class Noted - Resolved    Osteoarthritis of left knee ICD-10-CM: M17.12  ICD-9-CM: 715.96  6/26/2017 - Present        Osteoarthritis of right hip (Chronic) ICD-10-CM: M16.11  ICD-9-CM: 715.95  8/17/2014 - Present              This is a 66 y.o. female with a  history of ongoing knee pain secondary to degenerative joint disease. The patient has failed to respond to conservative care. The option of a total knee arthroplasty was discussed with the patient. Risks and benefits of the procedure were explained to the patient as well as other treatment options and possible surgical outcomes. The patient acknowledged and consent was obtained. The patient was therefore scheduled to undergo a left total knee arthroplasty with Dr. Paolo Marion. The patient was taken to the operating room for the above-stated procedure. IV antibiotics were given prior to the incision. SCDs were used for DVT prophylaxis. The patient had an estimated intraoperative blood loss of 150 mL. The patient tolerated the procedure well without any complications, and was taken to the recovery room in stable condition. The patient was then transferred to the postoperative orthopedic floor for convalescence, PT, pain management, as well as discharge planning. Physical therapy and occupational therapy initiated their evaluation and treatment and continued to follow the patient until the patient was discharged. For pain control, a femoral nerve block was used for a bolus the day of surgery and then removed. Exparel was injected intraoperatively as well for post-op pain management.  The patient then was transitioned over to oral narcotics in which was well tolerated. DVT prophylaxis was initiated on the day of surgery including; aspirin, compression stockings and bilateral foot pumps. At the time of discharge, the patient was able to ambulate safely, go up and down stairs and had an understanding of the explicit discharge precautions and instructions following surgery. Home Health will come out to assist the patient with this. The patient was discharged to follow up with Dr. Luca So in approximately 10 to 14 days. Discharge Condition: Good  DISPOSITION: To home. On the day of discharge the patient was afebrile. Vital signs were stable. The patient was in no acute distress. her Left knee incision was clean, dry, and intact. Extremity was warm and well-perfused, distally neurovascularly intact. DISCHARGE INSTRUCTIONS:    The patient will be discharged home on aspirin x 1 month for DVT prophylaxis. Continue physical therapy for range of motion, gait training and strengthening. Continue therapeutic exercises. Follow up in 10 to 14 days with Dr. Luca So. DISCHARGE MEDICATIONS:   Current Discharge Medication List      START taking these medications    Details   aspirin delayed-release 81 mg tablet Take 1 Tab by mouth two (2) times a day. Qty: 60 Tab, Refills: 0      oxyCODONE-acetaminophen (PERCOCET) 5-325 mg per tablet Take 1-2 Tabs by mouth every four (4) hours as needed for Pain. Max Daily Amount: 12 Tabs. Qty: 60 Tab, Refills: 0      promethazine (PHENERGAN) 12.5 mg tablet Take 2 Tabs by mouth every six (6) hours as needed for Nausea. Qty: 40 Tab, Refills: 0         CONTINUE these medications which have NOT CHANGED    Details   valsartan-hydrochlorothiazide (DIOVAN HCT) 320-25 mg per tablet Take 1 Tab by mouth daily. atorvastatin (LIPITOR) 20 mg tablet Take 20 mg by mouth nightly.          STOP taking these medications       ibuprofen (ADVIL) 200 mg tablet Comments: Reason for Stopping:

## 2017-06-27 NOTE — PROGRESS NOTES
Problem: Self Care Deficits Care Plan (Adult)  Goal: *Acute Goals and Plan of Care (Insert Text)  OCCUPATIONAL THERAPY EVALUATION/DISCHARGE     Patient: Cristhian England (89 y.o. female)  Date: 6/27/2017  Primary Diagnosis: OSTEOARTHRITIS LEFT KNEE  Osteoarthritis of left knee  Procedure(s) (LRB):  LEFT TOTAL KNEE REPLACEMENT (Left) 1 Day Post-Op   Precautions:  Fall, WBAT      ASSESSMENT AND RECOMMENDATIONS:  Based on the objective data described below, the patient presents with Left TKA. Pt [facility] for LB dressing and supervision for transfers and functional mobility. Pt education on home safety, ADL technique and fall prevention and demonstrated understanding through verbal feedback. Pt reported feeling a little dizzy with change of position. No further skilled acute OT needs indicated at this time. Skilled occupational therapy is not indicated at this time. Discharge Recommendations: Home Health  Further Equipment Recommendations for Discharge: N/A        SUBJECTIVE:   Patient stated I'm doing much better than this morning.       OBJECTIVE DATA SUMMARY:       Past Medical History:   Diagnosis Date    Adverse effect of anesthesia       slow to wake up    Arthritis      Hypertension 2002    Ill-defined condition       cholesterol    Nausea & vomiting      Osteoarthritis of right hip 8/17/2014     Past Surgical History:   Procedure Laterality Date    HX HYSTERECTOMY         partial    TOTAL HIP ARTHROPLASTY   2009     left    TOTAL HIP ARTHROPLASTY Right 2014     Barriers to Learning/Limitations: None  Compensate with: visual, verbal, tactile, kinesthetic cues/model  Prior Level of Function/Home Situation: I with ADLs prior to admission  Home Situation  Home Environment: Private residence  # Steps to Enter: 4  Rails to Enter: Yes  Hand Rails : Right  One/Two Story Residence: One story  Living Alone: No  Support Systems: Spouse/Significant Other/Partner  Patient Expects to be Discharged to[de-identified] Private residence  Current DME Used/Available at Home: kurt Koroma  [ ]     Right hand dominant        [ ]     Left hand dominant  Cognitive/Behavioral Status:  Neurologic State: Alert; Appropriate for age  Orientation Level: Oriented X4  Safety/Judgement: Awareness of environment; Fall prevention  Skin: incision on left LE covered with dressing  Edema: min edema noted on left LE   Vision/Perceptual:   N/A  Coordination:  Coordination: Within functional limits  Fine Motor Skills-Upper: Left Intact; Right Intact    Gross Motor Skills-Upper: Left Intact; Right Intact  Balance:  Sitting: Intact  Standing: Intact; With support  Strength:  Strength: Within functional limits  Tone & Sensation:  Tone: Normal  Sensation: Intact  Range of Motion:  AROM: Within functional limits  Functional Mobility and Transfers for ADLs:  Bed Mobility:  Supine to Sit: Modified independent;Supervision  Sit to Supine: Modified independent;Supervision  Transfers:  Sit to Stand: Supervision              Toilet Transfer : Supervision              ADL Assessment:  Upper Body Dressing: Supervision  Lower Body Dressing: Contact guard assistance  Toileting: Supervision  ADL Intervention:  Cognitive Retraining  Safety/Judgement: Awareness of environment; Fall prevention     Pain:  Pain Scale 1: Numeric (0 - 10)  Pain Intensity 1: 6  Pain Location 1: Knee  Pain Orientation 1: Left  Pain Description 1: Throbbing  Pain Intervention(s) 1: Medication (see MAR)  Activity Tolerance:   good  Please refer to the flowsheet for vital signs taken during this treatment.   After treatment:   [ ]  Patient left in no apparent distress sitting up in chair  [X]  Patient left in no apparent distress in bed  [X]  Call bell left within reach  [ ]  Nursing notified  [ ]  Caregiver present  [ ]  Bed alarm activated      COMMUNICATION/EDUCATION:   Communication/Collaboration:  [X]      Home safety education was provided and the patient/caregiver indicated understanding. [X]      Patient/family have participated as able and agree with findings and recommendations. [ ]      Patient is unable to participate in plan of care at this time.      Deanne Coe, OTR/L  Time Calculation: 20 mins

## 2017-06-27 NOTE — ROUTINE PROCESS
Bedside and Verbal shift change report given to Delia Montilla RN by Sally Solis. Report included the following information SBAR, Kardex, OR Summary, Intake/Output and MAR.

## 2017-06-27 NOTE — PROGRESS NOTES
Progress Note     Patient: Jonathan Agrawal MRN: [id number]  SSN: [id number]    YOB: 1945  Age: 66 y.o. Sex: female      POD:    1 Day Post-Op  S/P:    Procedure(s):  LEFT TOTAL KNEE REPLACEMENT     Subjective:   Pt is without complaints of pain. Objective:     Patient Vitals for the past 12 hrs:   BP Temp Pulse Resp SpO2   06/27/17 0302 98/50 97.5 °F (36.4 °C) 88 16 95 %   06/27/17 0010 95/56 97.4 °F (36.3 °C) 70 16 99 %   06/26/17 2209 97/49 98 °F (36.7 °C) 70 16 97 %   06/26/17 2113 94/53 98.2 °F (36.8 °C) 68 16 94 %   06/26/17 2007 92/51 98.3 °F (36.8 °C) 71 16 95 %   06/26/17 1900 123/72 98.6 °F (37 °C) (!) 108 16 95 %     Recent Labs      06/27/17   0509   HGB  11.8*   HCT  35.5       Pt resting in bed. Lower extremity operative dressing clean/dry/intact. Neurovascularly intact. Calves soft, nontender. Assessment:     Awake and alert. In good spirits. X rays satisfactory. Some nausea last night. Plan:   1. Continue pain management/ ice to operative area. 2. Continue to progress with PT/OT. 3. Discharge planning to home with home health.

## 2017-06-27 NOTE — PROGRESS NOTES
Progress Note     Patient: Murtis Section MRN: [id number]  SSN: [id number]    YOB: 1945  Age: 66 y.o. Sex: female      POD:    1 Day Post-Op  S/P:    Procedure(s):  LEFT TOTAL KNEE REPLACEMENT     Subjective:   Pt is without complaints of pain. Patient is having some nausea and had several episodes of vomiting yesterday. She has not worked with PT yet. She states that she would like to go home but needs to get control of her nausea first.     Objective:     Patient Vitals for the past 12 hrs:   BP Temp Pulse Resp SpO2   06/27/17 0742 106/61 98.3 °F (36.8 °C) 72 16 95 %   06/27/17 0302 98/50 97.5 °F (36.4 °C) 88 16 95 %   06/27/17 0010 95/56 97.4 °F (36.3 °C) 70 16 99 %   06/26/17 2209 97/49 98 °F (36.7 °C) 70 16 97 %   06/26/17 2113 94/53 98.2 °F (36.8 °C) 68 16 94 %     Recent Labs      06/27/17   0509   HGB  11.8*   HCT  35.5   NA  140   K  3.8   CL  103   CO2  27   BUN  15   CREA  0.84   GLU  131*       Pt. resting in bed. Lower extremity operative dressing clean/dry/intact. Neurovascularly intact. Calves soft, nontender. Assessment:     Awake and alert. In good spirits. Plan:   1. Continue pain management/ ice to operative area. 2. Continue to progress with PT/OT. 3. Discharge planning to home with home health today as long as her nausea is controlled, her pain remains well-controlled, and she passes PT.  4. Patient encouraged to drink water and order bland food from the cafeteria that she can easily tolerate. We will give her Reglan in the hospital today as well as a prescription for phenergan to go home with so that she can control any remaining nausea.

## 2017-06-27 NOTE — PROGRESS NOTES
Met with pt in room. Pt plans discharge home, has spouse to assist. [facility] offered and pt chose [facility] [phone] for follow up; referral placed with CMS and liaison aware. Pt has RW for home. Care Management Interventions  PCP Verified by CM:  Yes  Transition of Care Consult (CM Consult): [address]: Yes  Discharge Durable Medical Equipment: No (pt has RW)  Physical Therapy Consult: Yes  Occupational Therapy Consult: Yes  Current Support Network: Lives with Spouse, Own Home  Confirm Follow Up Transport: Family  Plan discussed with Pt/Family/Caregiver: Yes  Freedom of Choice Offered: Yes  Discharge Location  Discharge Placement: Home with home health

## 2017-06-27 NOTE — PROGRESS NOTES
1886 Assessment complete. Patient alert/drowsy but easily arousable and oriented x 4. Cap refills brisk and less than 3 sec. Pedal pulses palpable. Lung sounds clear, bilaterally. Respirations even and unlabored. Abd soft and nontender. Bowel sounds hypoactive to all 4 quads, encouraged warm drinks and prune juice. Patient has voided without difficulty. Skin warm and dry. Ace wrap to left knee knee noted CDI. IV to left arm, site CDI. Denies numbness/tingling to bilateral lower extremities. Dalton hose applied to right lower extremity. Plexi's to bilateral lower extremities. Patient stable, no apparent distress at this time, bed in locked position, ice pack applied. Patient resting in bed, with call light in reach, stated will order some hot tea and prune juice at this time. 0730 Spoke with DEBBIE Beckwith. SBAR given and informed of N/V over night as well as hypoactive bowel sounds. Orders given for Reglan and MOM.     0824 PRN Reglan administered. 1746 PAIN medication given. Potential medication side effects explained to patient, patient verbalizes understanding, opportunities for questions provided. Patient stable, no apparent distress at this time, bed in locked position, call bell within reach. 1403 PAIN medication given. Potential medication side effects explained to patient, patient verbalizes understanding, opportunities for questions provided. Patient stable, no apparent distress at this time, bed in locked position, call bell within reach. Shift summary: Patient had uneventful shift. Patient ambulated with assistance. Pain remained well-controlled with medication. No issues/concerns at this time    1642 Dual AVS reviewed with Evelyne Davis RN. All medications reviewed individually with patient. Opportunities for questions and concerns provided. Patient discharged via (mode of transport ie. Car, ambulance or air transport) car. Patient's arm band appropriately discarded.

## 2017-06-27 NOTE — ROUTINE PROCESS
Bedside shift change report given to IGLESIA AMANDA RN (oncoming nurse) by Katelynn HENDRIX RN (offgoing nurse). Report included the following information SBAR, Kardex and MAR. Subjective:        Patient ID: Gerda Lai is a 82 y.o. female.    Chief Complaint: Numbness (tingling bilateral legs)    HPI   Gerda Lai presents with c/o tingling and pain down the outside of the left thigh and leg.  This started 1-2 months ago.  The pain comes and goes, does not seem to be related to specific activities but pt does think it's better when she's standing up as opposed to sitting.  Pt denies associated low back pain, pain in the buttock, leg weakness.  Since sx started, they have not changed significantly in severity.    When sx first started pt reports the tingling and pain seemed to move around different parts of her body.  Now she only has the sx in the LLE.  She was seen in the ER for this issue and told she had L hip bursitis and possibly a pinched nerve.  She has been applying ice and taking Tylenol as recommended with some relief.    Review of Systems  as per HPI      Objective:        Vitals:    01/04/17 1506   BP: (!) 146/70   Pulse: 64   Temp: 97.5 °F (36.4 °C)     Physical Exam   Constitutional: She is oriented to person, place, and time. She appears well-developed and well-nourished. No distress.   Musculoskeletal:   - b/l LEs normal inspection, no deformity, non-tender, full AROM, 5/5 strength, 2+ DTRs, sensation to light touch and temp intact and symmetrical b/l  - straight leg raise negative b/l   Neurological: She is alert and oriented to person, place, and time.   Skin: Skin is warm and dry.   Vitals reviewed.          Assessment:         1. Neuropathic pain, leg, left    2. Hypertension, essential    3. Disturbance of skin sensation     4. Fibromyalgia               Plan:         Gerda was seen today for numbness.    Diagnoses and all orders for this visit:    Neuropathic pain, leg, left: Sx more consistent with a peripheral neuropathy; pt w/o low back pain, neuro exam WNL.  ?compression neuropathy related to L hip bursitis.  Less likely meralgia paresthetica given  involvement of L leg.  - Labs ordered  - Pt would like to follow up with rheum to see if this could be related to FM or if nerve testing can be done in his office before referral for NCS  - Do not think xray or MRI would be useful at this time  - Discussed gabapentin to treat pain but pt would like to continue with ice and Tylenol for now; will let me know if pain gets worse or more bothersome.    Hypertension, essential: Pt brings bp log from home showing bps above goal 150/90.  Pt's bp generally well controlled when checked at her office visits.  Recommend continuing current regimen, checking bp in the AM before breakfast and pt is supposed to follow up with cardiology in the next month.    Disturbance of skin sensation: As per above     Fibromyalgia: Pt has f/u w rheum.      Follow up after rheum appt and labs.

## 2017-06-28 ENCOUNTER — HOME CARE VISIT (OUTPATIENT)
Dept: SCHEDULING | Facility: HOME HEALTH | Age: 72
End: 2017-06-28
Payer: COMMERCIAL

## 2017-06-28 VITALS
HEART RATE: 93 BPM | SYSTOLIC BLOOD PRESSURE: 108 MMHG | TEMPERATURE: 99.2 F | OXYGEN SATURATION: 95 % | RESPIRATION RATE: 18 BRPM | DIASTOLIC BLOOD PRESSURE: 66 MMHG | HEIGHT: 60 IN | BODY MASS INDEX: 43.59 KG/M2 | WEIGHT: 222 LBS

## 2017-06-28 PROCEDURE — 400013 HH SOC

## 2017-06-28 PROCEDURE — G0299 HHS/HOSPICE OF RN EA 15 MIN: HCPCS

## 2017-06-29 ENCOUNTER — HOME CARE VISIT (OUTPATIENT)
Dept: SCHEDULING | Facility: HOME HEALTH | Age: 72
End: 2017-06-29
Payer: COMMERCIAL

## 2017-06-29 ENCOUNTER — HOME CARE VISIT (OUTPATIENT)
Dept: HOME HEALTH SERVICES | Facility: HOME HEALTH | Age: 72
End: 2017-06-29
Payer: COMMERCIAL

## 2017-06-29 PROCEDURE — G0151 HHCP-SERV OF PT,EA 15 MIN: HCPCS

## 2017-06-30 ENCOUNTER — HOME CARE VISIT (OUTPATIENT)
Dept: HOME HEALTH SERVICES | Facility: HOME HEALTH | Age: 72
End: 2017-06-30
Payer: COMMERCIAL

## 2017-06-30 VITALS
OXYGEN SATURATION: 94 % | DIASTOLIC BLOOD PRESSURE: 70 MMHG | SYSTOLIC BLOOD PRESSURE: 120 MMHG | HEART RATE: 86 BPM | TEMPERATURE: 98.4 F

## 2017-06-30 VITALS — HEART RATE: 96 BPM | OXYGEN SATURATION: 92 % | SYSTOLIC BLOOD PRESSURE: 130 MMHG | DIASTOLIC BLOOD PRESSURE: 70 MMHG

## 2017-06-30 PROCEDURE — G0157 HHC PT ASSISTANT EA 15: HCPCS

## 2017-07-03 ENCOUNTER — HOME CARE VISIT (OUTPATIENT)
Dept: SCHEDULING | Facility: HOME HEALTH | Age: 72
End: 2017-07-03
Payer: COMMERCIAL

## 2017-07-03 VITALS — DIASTOLIC BLOOD PRESSURE: 78 MMHG | OXYGEN SATURATION: 97 % | SYSTOLIC BLOOD PRESSURE: 140 MMHG | HEART RATE: 97 BPM

## 2017-07-03 VITALS
TEMPERATURE: 98.7 F | OXYGEN SATURATION: 97 % | DIASTOLIC BLOOD PRESSURE: 78 MMHG | RESPIRATION RATE: 18 BRPM | SYSTOLIC BLOOD PRESSURE: 140 MMHG | HEART RATE: 97 BPM

## 2017-07-03 PROCEDURE — G0299 HHS/HOSPICE OF RN EA 15 MIN: HCPCS

## 2017-07-03 PROCEDURE — G0157 HHC PT ASSISTANT EA 15: HCPCS

## 2017-07-05 ENCOUNTER — HOME CARE VISIT (OUTPATIENT)
Dept: SCHEDULING | Facility: HOME HEALTH | Age: 72
End: 2017-07-05
Payer: COMMERCIAL

## 2017-07-05 PROCEDURE — G0157 HHC PT ASSISTANT EA 15: HCPCS

## 2017-07-07 ENCOUNTER — HOME CARE VISIT (OUTPATIENT)
Dept: SCHEDULING | Facility: HOME HEALTH | Age: 72
End: 2017-07-07
Payer: COMMERCIAL

## 2017-07-07 VITALS — SYSTOLIC BLOOD PRESSURE: 130 MMHG | HEART RATE: 91 BPM | OXYGEN SATURATION: 96 % | DIASTOLIC BLOOD PRESSURE: 96 MMHG

## 2017-07-07 PROCEDURE — G0157 HHC PT ASSISTANT EA 15: HCPCS

## 2017-07-07 PROCEDURE — G0162 HHC RN E&M PLAN SVS, 15 MIN: HCPCS

## 2017-07-09 VITALS — DIASTOLIC BLOOD PRESSURE: 72 MMHG | SYSTOLIC BLOOD PRESSURE: 132 MMHG | HEART RATE: 89 BPM | OXYGEN SATURATION: 96 %

## 2017-07-10 ENCOUNTER — HOME CARE VISIT (OUTPATIENT)
Dept: SCHEDULING | Facility: HOME HEALTH | Age: 72
End: 2017-07-10
Payer: COMMERCIAL

## 2017-07-10 VITALS
SYSTOLIC BLOOD PRESSURE: 110 MMHG | RESPIRATION RATE: 18 BRPM | DIASTOLIC BLOOD PRESSURE: 71 MMHG | OXYGEN SATURATION: 97 % | TEMPERATURE: 98.4 F | HEART RATE: 76 BPM

## 2017-07-10 VITALS — SYSTOLIC BLOOD PRESSURE: 110 MMHG | HEART RATE: 96 BPM | DIASTOLIC BLOOD PRESSURE: 80 MMHG | OXYGEN SATURATION: 95 %

## 2017-07-10 PROCEDURE — G0151 HHCP-SERV OF PT,EA 15 MIN: HCPCS

## 2017-07-11 ENCOUNTER — HOME CARE VISIT (OUTPATIENT)
Dept: HOME HEALTH SERVICES | Facility: HOME HEALTH | Age: 72
End: 2017-07-11
Payer: COMMERCIAL

## 2017-07-12 ENCOUNTER — HOME CARE VISIT (OUTPATIENT)
Dept: SCHEDULING | Facility: HOME HEALTH | Age: 72
End: 2017-07-12
Payer: COMMERCIAL

## 2017-07-12 VITALS — HEART RATE: 96 BPM | SYSTOLIC BLOOD PRESSURE: 120 MMHG | OXYGEN SATURATION: 96 % | DIASTOLIC BLOOD PRESSURE: 80 MMHG

## 2017-07-12 PROCEDURE — G0157 HHC PT ASSISTANT EA 15: HCPCS

## 2017-07-14 ENCOUNTER — HOME CARE VISIT (OUTPATIENT)
Dept: SCHEDULING | Facility: HOME HEALTH | Age: 72
End: 2017-07-14
Payer: COMMERCIAL

## 2017-07-14 PROCEDURE — G0151 HHCP-SERV OF PT,EA 15 MIN: HCPCS

## 2017-07-15 VITALS
DIASTOLIC BLOOD PRESSURE: 80 MMHG | TEMPERATURE: 97.5 F | SYSTOLIC BLOOD PRESSURE: 124 MMHG | OXYGEN SATURATION: 98 % | HEART RATE: 82 BPM

## 2017-07-20 ENCOUNTER — HOSPITAL ENCOUNTER (OUTPATIENT)
Dept: PHYSICAL THERAPY | Age: 72
Discharge: HOME | End: 2017-07-20
Payer: COMMERCIAL

## 2017-07-20 PROCEDURE — 97162 PT EVAL MOD COMPLEX 30 MIN: CPT

## 2017-07-20 PROCEDURE — 97110 THERAPEUTIC EXERCISES: CPT

## 2017-07-20 NOTE — PROGRESS NOTES
[address] [facility] AT [facility]  [address] [facility] [address] [place] [address],[address], [address], [address]  Phone [phone]  Fax [phone] / [address] [place] [address] PHYSICAL THERAPY SERVICES  Patient Name: Yonathan Price : 1945   Medical   Diagnosis: Unilateral primary osteoarthritis, left knee [M17.12]  Unspecified arthropathy, lower leg [M12.9] Treatment Diagnosis: S/p left TKR   Onset Date: 17     Referral Source: Farhat Barkley MD [facility]): 2017   Prior Hospitalization: See medical history Provider #: [id number]   Prior Level of Function: independent   Comorbidities: HTN   Medications: Verified on Patient Summary List   The Plan of Care and following information is based on the information from the initial evaluation.   ===========================================================================================  Assessment / key information:  Patient is a 66 y.o. female who presents to [facility] with a diagnosis of Unilateral primary osteoarthritis, left knee [M17.12]  Unspecified arthropathy, lower leg [M12.9]. CHIEF COMPLAINT: Patient presents with left knee pain, swelling, decreased mobility and decreased strength s/p left TKR on 17. She is her own historian. As per patient, she received 3 weeks of home health PT and she completes her HEP daily. She presents ambulating with a SC. No c/o LOB/falls/LE buckling/numbness. Pain can increase to as much as a 10/10 VAS and is localized to the anterior aspect of the left knee. DEFICITS: unable to negotiate stairs with reciprocal gait, ambulate in community without use of SC, and perform a squat. Patient's FOTO score was a 38/100 indicating decreased function. Patient's Goal: Bend the knee without pain. A HEP was initiated to assist with POC in restoring function.  Patient will benefit from a POC addressing such impairments and limitations in order to improve quality of life and return to PLOF.    ==================================================================================Eval Complexity: History: MEDIUM  Complexity : 1-2 comorbidities / personal factors will impact the outcome/ POC Exam:MEDIUM Complexity : 3 Standardized tests and measures addressing body structure, function, activity limitation and / or participation in recreation  Presentation: MEDIUM Complexity : Evolving with changing characteristics  Clinical Decision Making:MEDIUM Complexity : FOTO score of 26-74Overall Complexity:MEDIUM  Problem List: pain affecting function, decrease ROM, decrease strength, edema affecting function, impaired gait/ balance, decrease ADL/ functional abilitiies, decrease activity tolerance, decrease flexibility/ joint mobility and decrease transfer abilities   Treatment Plan may include any combination of the following: Therapeutic exercise, Therapeutic activities, Neuromuscular re-education, Physical agent/modality, Gait/balance training, Manual therapy, Patient education, Functional mobility training and Stair training  Patient / Family readiness to learn indicated by: asking questions, trying to perform skills and interest  Persons(s) to be included in education: patient (P)  Barriers to Learning/Limitations: None  Measures taken:    Patient Goal (s): Bend the knee without pain   Patient self reported health status: good  Rehabilitation Potential: excellent   Short Term Goals: To be accomplished in  2  treatments:  1. Pt will be compliant with HEP for symptom management at home.  Long Term Goals: To be accomplished in  12  treatments:  1. Pt will demonstrate an increased FOTO score to 58/100 in order to improve function  2. Pt will be independent with HEP at D/C for self management. 3. Pt will demonstrate decreased edema in the left knee by 3cm in order to increase mobility for functional activities  4.  Pt will demonstrate increased left knee mobility to WNLs in order to perform functional activities and bend more easily  5. Pt will demonstrate increased strength in the left knee to WNLs in order to increase her ability to ambulate in the community and negotiate stairs with decreased complaints. 6. Pt will be able to negotiate 12, 6in stairs with reciprocal gait and unilateral railing in order to access the second floor of her home with increased ease. Frequency / Duration:     Patient to be seen  3  times per week for 12  treatments:  Patient / Caregiver education and instruction: activity modification and exercises    Therapist Signature: Sakina Jaramillo PT Date: 2/69/7075   Certification Period:  Time: 4:32 PM   ===========================================================================================  I certify that the above Physical Therapy Services are being furnished while the patient is under my care. I agree with the treatment plan and certify that this therapy is necessary. Physician Signature:        Date:       Time:     Please sign and return to [facility] or you may fax the signed copy to [phone]. Thank you.

## 2017-07-20 NOTE — PROGRESS NOTES
PT KNEE EVAL AND TREATMENT    Patient Name: Jeff Lovelace  Date:2017  : 1945  [x]  Patient  Verified  Payor: Rimma Levels / Plan: Lala Husain / Product Type: HMO /    In time: 210  Out time:310  Total Treatment Time (min): 60  Total Timed Codes (min): 30     Visit #: 1 of 12    Treatment Area: Unilateral primary osteoarthritis, left knee [M17.12]  Unspecified arthropathy, lower leg [M12.9]    SUBJECTIVE  Pain Level (0-10 on visual analog scale): 4  Any medication changes, allergies to medications, diagnosis change, or new procedure performed: see summary sheet for update  Subjective functional status/changes:  CHIEF COMPLAINT: Patient presents with left knee pain, swelling, decreased mobility and decreased strength s/p left TKR on 17. She is her own historian. As per patient, she received 3 weeks of home health PT and she completes her HEP daily. She presents ambulating with a SC. No c/o LOB/falls/LE buckling/numbness. Pain can increase to as much as a 10/10 VAS and is localized to the anterior aspect of the left knee.   DEFICITS: unable to negotiate stairs with reciprocal gait, ambulate in community without use of SC, and perform a squat    OBJECTIVE  Posture/Observations: RLE genu varum; left knee incision clean, dry, intact; mild warmth noted without erythema; no drainage  Gait: decreased stance LLE with decreased hip extension and push off, decreased knee flexion during swing phase, and decreased reciprocal arm swing with increased lateral propulsion  Palpation: + mild warmth noted along left incision    ROM / Strength    Knee AROM/PROM: supine  Left knee: -10-95 AROM -8-105 PROM  Right knee: 0-130 AROM 0-135 PROM    Left Knee strength: flexion 3+/5, extension 4/5 within restricted ROM    Hip AROM/ PROM  WFLs  Hip strength: flexion 4/5, ABD 4-/5, extension 4-/5    Optional Tests:  Patellar Positioning/Tracking/Mobility  + hypomobility left PF medial glides  Crepitus present  []Yes [x]No     Circumferential Measurements:  Superior Patellar Poles       Left 46cm   Right  43cm        Manual:  min     Modality (rationale):   []  E-Stim: type _ x _ min     []att   []unatt   []w/US   []w/ice   []w/heat  []  Traction: []cerv   []pelvic   _ lbs x _ min     []pro   []sup   []int   []const  []  Ultrasound: []cont   []pulse    _ W/cm2 x _  min   []1MHz   []3MHz  []  Iontophoresis: []take home patch w/ dexamethazone    []40mA   []80mA                               []_ mA min w/: []dexamethazone   []other:_  []  Ice pack _  min     [] Hot pack _  min     [] Paraffin _  min  []  Other:     Patient Education: [x] Established HEP    [x] POT (minutes) :HEP instruction/implementation as well as review of POC    Pain Level (0-10 scale) post treatment: 3    ASSESSMENT  [x]  See Plan of Care    PLAN  [x]  Upgrade activities as tolerated     [x] Other:_    See POC for Frequency/duration of visits   levels  Justification for Eval Code Complexity:   Patient History : Patient lives in a 2 story home with her  with 4 LEONELA; bedroom on first floor  Examination: (see exam)  Clinical Presentation: pain fluctuates based on activity    Clinical Decision Making : FOTO : 38 /100     Justa Silva, PT 7/20/2017  4:20 PM

## 2017-07-27 ENCOUNTER — HOSPITAL ENCOUNTER (OUTPATIENT)
Dept: PHYSICAL THERAPY | Age: 72
Discharge: HOME | End: 2017-07-27
Payer: COMMERCIAL

## 2017-07-27 PROCEDURE — 97140 MANUAL THERAPY 1/> REGIONS: CPT | Performed by: PHYSICAL THERAPIST

## 2017-07-27 PROCEDURE — 97110 THERAPEUTIC EXERCISES: CPT | Performed by: PHYSICAL THERAPIST

## 2017-07-27 NOTE — PROGRESS NOTES
PT DAILY TREATMENT NOTE 8    Patient Name: Paul Clark  Date:2017  : 1945  [x]  Patient  Verified  Payor: Janice Gurpreet / Plan: Wood Boone / Product Type: HMO /    In time:500  Out time:600  Total Treatment Time (min): 60  Total Timed Codes (min): 50  1:1 Treatment Time (min):    Visit #: 2 of 12    Treatment Area: Unilateral primary osteoarthritis, left knee [M17.12]  Unspecified arthropathy, lower leg [M12.9]    SUBJECTIVE  Pain Level (0-10 scale): 5  Any medication changes, allergies to medications, adverse drug reactions, diagnosis change, or new procedure performed?: [x] No    [] Yes (see summary sheet for update)  Subjective functional status/changes:   [] No changes reported  It is really hot and swollen today. It's hard for me to get comfortable at night.     OBJECTIVE  Modality rationale: decrease edema, decrease inflammation and decrease pain to improve the patients ability to perform functional mobility and improve activity  endurance     Min Type Additional Details    [] Estim: []Att   []Unatt        []TENS instruct                  []IFC  []Premod   []NMES                     []Other:  []w/US   []w/ice   []w/heat  Position:  Location:    []  Traction: [] Cervical       []Lumbar                       [] Prone          []Supine                       []Intermittent   []Continuous Lbs:  [] before manual  [] after manual    []  Ultrasound: []Continuous   [] Pulsed                           []1MHz   []3MHz Location:  W/cm2:    []  Iontophoresis with dexamethasone         Location: [] Take home patch   [] In clinic   10 [x]  Ice     []  heat  []  Ice massage Position:  Location:    []  Vasopneumatic Device Pressure:       [] lo [] med [] hi   Temperature: [] lo [] med [] hi   [] Skin assessment post-treatment:  []intact []redness- no adverse reaction       []redness - adverse reaction:       35 min Therapeutic Exercise:  [] See flow sheet :   Rationale: increase ROM, increase strength and increase proprioception to improve the patients ability to ambulate I to perform ADLs and [facility] chores. 15 min Manual Therapy:  Flexion/extension mobes. PROM stretching   Rationale: increase ROM and increase tissue extensibility to perform full AROM for transfers and ambulation of stairs           min Patient Education: [x] Review HEP    [] Progressed/Changed HEP based on:   [] positioning   [] body mechanics   [] transfers   [] heat/ice application        Other Objective/Functional Measures: first FU treatment since IE. Initiated POC Patient has 10 deg SLR lag, -5 deg with quad set, scar massage initiated with HEP instruction      Pain Level (0-10 scale) post treatment: 3    ASSESSMENT/Changes in Function: Patient educated in HEP and given hand out with VC/TC needed for most therex today as this is first fu since IE. Good quad set noted     Patient will continue to benefit from skilled PT services to modify and progress therapeutic interventions, address functional mobility deficits, address ROM deficits, address strength deficits, analyze and address soft tissue restrictions, analyze and cue movement patterns, analyze and modify body mechanics/ergonomics, assess and modify postural abnormalities, address imbalance/dizziness and instruct in home and community integration to attain remaining goals. []  See Plan of Care  []  See progress note/recertification  []  See Discharge Summary         Progress towards goals / Updated goals: · Short Term Goals: To be accomplished in  2  treatments:  1. Pt will be compliant with HEP for symptom management at home.     · Long Term Goals: To be accomplished in  12  treatments:  1. Pt will demonstrate an increased FOTO score to 58/100 in order to improve function  2. Pt will be independent with HEP at D/C for self management. 3. Pt will demonstrate decreased edema in the left knee by 3cm in order to increase mobility for functional activities  4.  Pt will demonstrate increased left knee mobility to WNLs in order to perform functional activities and bend more easily  5. Pt will demonstrate increased strength in the left knee to WNLs in order to increase her ability to ambulate in the community and negotiate stairs with decreased complaints.   6. Pt will be able to negotiate 12, 6in stairs with reciprocal gait and unilateral railing in order to access the second floor of her home with increased ease.       PLAN  [x]  Upgrade activities as tolerated     []  Continue plan of care  []  Update interventions per flow sheet       []  Discharge due to:_  []  Other:_      Steve Salazar, PT 7/27/2017  3:00 PM

## 2017-07-28 ENCOUNTER — HOSPITAL ENCOUNTER (OUTPATIENT)
Dept: PHYSICAL THERAPY | Age: 72
Discharge: HOME | End: 2017-07-28
Payer: COMMERCIAL

## 2017-07-28 PROCEDURE — 97016 VASOPNEUMATIC DEVICE THERAPY: CPT

## 2017-07-28 PROCEDURE — 97140 MANUAL THERAPY 1/> REGIONS: CPT

## 2017-07-28 PROCEDURE — 97110 THERAPEUTIC EXERCISES: CPT

## 2017-07-28 NOTE — PROGRESS NOTES
PT DAILY TREATMENT NOTE 8    Patient Name: Lavonne Ponce  Date:2017  : 1945  [x]  Patient  Verified  Payor: Beatriz Smith / Plan: John Pair / Product Type: HMO /    In time:230  Out time:336  Total Treatment Time (min): 66  Total Timed Codes (min): 64    Visit #: 3 of 12    Treatment Area: Unilateral primary osteoarthritis, left knee [M17.12]  Unspecified arthropathy, lower leg [M12.9]    SUBJECTIVE  Pain Level (0-10 scale): 7  Any medication changes, allergies to medications, adverse drug reactions, diagnosis change, or new procedure performed?: [x] No    [] Yes (see summary sheet for update)  Subjective functional status/changes:   [] No changes reported  My knee is sore bc I was just exercising last night    OBJECTIVE  Modality rationale: decrease edema, decrease inflammation and decrease pain to improve the patients ability to perform functional mobility and improve activity  endurance     Min Type Additional Details    [] Estim: []Att   []Unatt        []TENS instruct                  []IFC  []Premod   []NMES                     []Other:  []w/US   []w/ice   []w/heat  Position:  Location:    []  Traction: [] Cervical       []Lumbar                       [] Prone          []Supine                       []Intermittent   []Continuous Lbs:  [] before manual  [] after manual    []  Ultrasound: []Continuous   [] Pulsed                           []1MHz   []3MHz Location:  W/cm2:    []  Iontophoresis with dexamethasone         Location: [] Take home patch   [] In clinic   10 [x]  Ice     []  heat  []  Ice massage Position: left knee at end of session  Location:    []  Vasopneumatic Device Pressure:       [] lo [] med [] hi   Temperature: [] lo [] med [] hi   [x] Skin assessment post-treatment:  [x]intact []redness- no adverse reaction       []redness - adverse reaction:       31 min Therapeutic Exercise:  [] See flow sheet :   Rationale: increase ROM, increase strength and increase proprioception to improve the patients ability to ambulate I to perform ADLs and [place] chores. 25 min Manual Therapy:  Flexion/extension mobes. PROM stretching   Rationale: increase ROM and increase tissue extensibility to perform full AROM for transfers and ambulation of stairs           min Patient Education: [x] Review HEP    [] Progressed/Changed HEP based on:   [] positioning   [] body mechanics   [] transfers   [] heat/ice application        Other Objective/Functional Measures: Pt continues to have difficulty with active left knee flexion and is unable to perform a revolution on the recumbent bike. Tolerated 1/2 moons without difficulty       Pain Level (0-10 scale) post treatment: 5    ASSESSMENT/Changes in Function: Patient educated in [employer] and given hand out with VC/TC needed for most therex today as this is first fu since IE. Good quad set noted     Patient will continue to benefit from skilled PT services to modify and progress therapeutic interventions, address functional mobility deficits, address ROM deficits, address strength deficits, analyze and address soft tissue restrictions, analyze and cue movement patterns, analyze and modify body mechanics/ergonomics, assess and modify postural abnormalities, address imbalance/dizziness and instruct in home and community integration to attain remaining goals. []  See Plan of Care  []  See progress note/recertification  []  See Discharge Summary         Progress towards goals / Updated goals: · Short Term Goals: To be accomplished in  2  treatments:  1. Pt will be compliant with HEP for symptom management at home. -PROGRESSING 7/28/17     · Long Term Goals: To be accomplished in  12  treatments:  1. Pt will demonstrate an increased FOTO score to 58/100 in order to improve function  2. Pt will be independent with HEP at D/C for self management.   3. Pt will demonstrate decreased edema in the left knee by 3cm in order to increase mobility for functional activities  4. Pt will demonstrate increased left knee mobility to WNLs in order to perform functional activities and bend more easily  5. Pt will demonstrate increased strength in the left knee to WNLs in order to increase her ability to ambulate in the community and negotiate stairs with decreased complaints.   6. Pt will be able to negotiate 12, 6in stairs with reciprocal gait and unilateral railing in order to access the second floor of her home with increased ease.       PLAN  [x]  Upgrade activities as tolerated     []  Continue plan of care  []  Update interventions per flow sheet       []  Discharge due to:_  []  Other:_      Martha Paul, PT 7/28/2017  3:45 PM

## 2017-07-31 ENCOUNTER — HOSPITAL ENCOUNTER (OUTPATIENT)
Dept: PHYSICAL THERAPY | Age: 72
Discharge: HOME | End: 2017-07-31
Payer: COMMERCIAL

## 2017-07-31 PROCEDURE — 97110 THERAPEUTIC EXERCISES: CPT

## 2017-07-31 PROCEDURE — 97140 MANUAL THERAPY 1/> REGIONS: CPT

## 2017-07-31 PROCEDURE — 97016 VASOPNEUMATIC DEVICE THERAPY: CPT

## 2017-07-31 NOTE — PROGRESS NOTES
PT DAILY TREATMENT NOTE     Patient Name: Ok Ram  SWGE:[id number]  : 1945  [x]  Patient  Verified  Payor: Kristal Moore / Plan: [employer] / Product Type: HMO /    In time:853  Out time:1006  Total Treatment Time (min): 73  Total Timed Codes (min): 62    Visit #: 4 of 12    Treatment Area: Unilateral primary osteoarthritis, left knee [M17.12]  Unspecified arthropathy, lower leg [M12.9]    SUBJECTIVE  Pain Level (0-10 scale):  2  Any medication changes, allergies to medications, adverse drug reactions, diagnosis change, or new procedure performed?: [x] No    [] Yes (see summary sheet for update)  Subjective functional status/changes:   [] No changes reported  My knee is sore this weekend    OBJECTIVE  Modality rationale: decrease edema, decrease inflammation and decrease pain to improve the patients ability to perform functional mobility and improve activity  endurance     Min Type Additional Details    [] Estim: []Att   []Unatt        []TENS instruct                  []IFC  []Premod   []NMES                     []Other:  []w/US   []w/ice   []w/heat  Position:  Location:    []  Traction: [] Cervical       []Lumbar                       [] Prone          []Supine                       []Intermittent   []Continuous Lbs:  [] before manual  [] after manual    []  Ultrasound: []Continuous   [] Pulsed                           []1MHz   []3MHz Location:  W/cm2:    []  Iontophoresis with dexamethasone         Location: [] Take home patch   [] In clinic    []  Ice     []  heat  []  Ice massage Position:   Location:   15 [x]  Vasopneumatic Device Pressure:       [x] lo [] med [] hi   Temperature: [x] lo [] med [] hi   [x] Skin assessment post-treatment:  [x]intact []redness- no adverse reaction       []redness - adverse reaction:       38 min Therapeutic Exercise:  [] See flow sheet :   Rationale: increase ROM, increase strength and increase proprioception to improve the patients ability to ambulate I to perform ADLs and [facility] chores. 20 min Manual Therapy:  Flexion/extension mobes. PROM stretching in prone   Rationale: increase ROM and increase tissue extensibility to perform full AROM for transfers and ambulation of stairs           min Patient Education: [x] Review HEP    [] Progressed/Changed HEP based on:   [] positioning   [] body mechanics   [] transfers   [] heat/ice application        Other Objective/Functional Measures:   Pt with 100 degrees AROM into left knee flexion in prone at end of session    Pain Level (0-10 scale) post treatment: 2    ASSESSMENT/Changes in Function: Patient continues to have difficulty with left active knee flexion and unable to perform a full revolution on bike    Patient will continue to benefit from skilled PT services to modify and progress therapeutic interventions, address functional mobility deficits, address ROM deficits, address strength deficits, analyze and address soft tissue restrictions, analyze and cue movement patterns, analyze and modify body mechanics/ergonomics, assess and modify postural abnormalities, address imbalance/dizziness and instruct in home and community integration to attain remaining goals. []  See Plan of Care  []  See progress note/recertification  []  See Discharge Summary         Progress towards goals / Updated goals: · Short Term Goals: To be accomplished in  2  treatments:  1. Pt will be compliant with HEP for symptom management at home. -MET 7/31/17     · Long Term Goals: To be accomplished in  12  treatments:  1. Pt will demonstrate an increased FOTO score to 58/100 in order to improve function  2. Pt will be independent with HEP at D/C for self management. 3. Pt will demonstrate decreased edema in the left knee by 3cm in order to increase mobility for functional activities  4. Pt will demonstrate increased left knee mobility to WNLs in order to perform functional activities and bend more easily  5.  Pt will demonstrate increased strength in the left knee to WNLs in order to increase her ability to ambulate in the community and negotiate stairs with decreased complaints.   6. Pt will be able to negotiate 12, 6in stairs with reciprocal gait and unilateral railing in order to access the second floor of her home with increased ease.       PLAN  [x]  Upgrade activities as tolerated     []  Continue plan of care  []  Update interventions per flow sheet       []  Discharge due to:_  []  Other:_      Richard Young, PT 7/31/2017  3:45 PM

## 2017-08-02 ENCOUNTER — HOSPITAL ENCOUNTER (OUTPATIENT)
Dept: PHYSICAL THERAPY | Age: 72
Discharge: HOME | End: 2017-08-02
Payer: COMMERCIAL

## 2017-08-02 PROCEDURE — 97140 MANUAL THERAPY 1/> REGIONS: CPT | Performed by: PHYSICAL THERAPIST

## 2017-08-02 PROCEDURE — 97016 VASOPNEUMATIC DEVICE THERAPY: CPT | Performed by: PHYSICAL THERAPIST

## 2017-08-02 PROCEDURE — 97110 THERAPEUTIC EXERCISES: CPT | Performed by: PHYSICAL THERAPIST

## 2017-08-02 NOTE — PROGRESS NOTES
PT DAILY TREATMENT NOTE     Patient Name: Sonya Jones  RNOD:[id number]  : 1945  [x]  Patient  Verified  Payor: Moriah Peñaloza / Plan: Jim Valero / Product Type: HMO /    In time:900 Out time:1005  Total Treatment Time (min): 65  Total Timed Codes (min): 72    Visit #: 5 of 12    Treatment Area: Unilateral primary osteoarthritis, left knee [M17.12]  Unspecified arthropathy, lower leg [M12.9]    SUBJECTIVE  Pain Level (0-10 scale): 6-7  Any medication changes, allergies to medications, adverse drug reactions, diagnosis change, or new procedure performed?: [x] No    [] Yes (see summary sheet for update)  Subjective functional status/changes:   [] No changes reported  I am really swollen today    OBJECTIVE  Modality rationale: decrease edema, decrease inflammation and decrease pain to improve the patients ability to perform functional mobility and improve activity  endurance     Min Type Additional Details    [] Estim: []Att   []Unatt        []TENS instruct                  []IFC  []Premod   []NMES                     []Other:  []w/US   []w/ice   []w/heat  Position:  Location:    []  Traction: [] Cervical       []Lumbar                       [] Prone          []Supine                       []Intermittent   []Continuous Lbs:  [] before manual  [] after manual    []  Ultrasound: []Continuous   [] Pulsed                           []1MHz   []3MHz Location:  W/cm2:    []  Iontophoresis with dexamethasone         Location: [] Take home patch   [] In clinic    []  Ice     []  heat  []  Ice massage Position:   Location:   15 [x]  Vasopneumatic Device Pressure:       [x] lo [] med [] hi   Temperature: [x] lo [] med [] hi   [x] Skin assessment post-treatment:  [x]intact []redness- no adverse reaction       []redness - adverse reaction:       27 min Therapeutic Exercise:  [] See flow sheet :   Rationale: increase ROM, increase strength and increase proprioception to improve the patients ability to ambulate I to perform ADLs and [place] chores. 23 min Manual Therapy:  Flexion/extension mobes. PROM stretching in prone   Rationale: increase ROM and increase tissue extensibility to perform full AROM for transfers and ambulation of stairs           min Patient Education: [x] Review HEP    [] Progressed/Changed HEP based on:   [] positioning   [] body mechanics   [] transfers   [] heat/ice application        Other Objective/Functional Measures:   Pt with 90 degrees AROM into left knee flexion in prone at end of session due to increased swelling: AROM decreased since last treatment. Patient reports she didn't do her ice yesterday and that is most likely the reason  Continues with mod tightness to med HS  Pain Level (0-10 scale) post treatment: 4-5    ASSESSMENT/Changes in Function: Patient has increased swelling noted today limiting AROM, decreased AROM from 100 to 90 today, patient instructed to resume ice therapy at home and to elevate LE above heart later this evening while icing. Patient will continue to benefit from skilled PT services to modify and progress therapeutic interventions, address functional mobility deficits, address ROM deficits, address strength deficits, analyze and address soft tissue restrictions, analyze and cue movement patterns, analyze and modify body mechanics/ergonomics, assess and modify postural abnormalities, address imbalance/dizziness and instruct in home and community integration to attain remaining goals. []  See Plan of Care  []  See progress note/recertification  []  See Discharge Summary         Progress towards goals / Updated goals: · Short Term Goals: To be accomplished in  2  treatments:  1. Pt will be compliant with HEP for symptom management at home. -MET 7/31/17     · Long Term Goals: To be accomplished in  12  treatments:  1. Pt will demonstrate an increased FOTO score to 58/100 in order to improve function  2.  Pt will be independent with HEP at D/C for self management. 3. Pt will demonstrate decreased edema in the left knee by 3cm in order to increase mobility for functional activities Not met  8-2-17 Swelling increased  4. Pt will demonstrate increased left knee mobility to WNLs in order to perform functional activities and bend more easily  5. Pt will demonstrate increased strength in the left knee to WNLs in order to increase her ability to ambulate in the community and negotiate stairs with decreased complaints.   6. Pt will be able to negotiate 12, 6in stairs with reciprocal gait and unilateral railing in order to access the second floor of her home with increased ease.       PLAN  [x]  Upgrade activities as tolerated     []  Continue plan of care  []  Update interventions per flow sheet       []  Discharge due to:_  []  Other:_      Brittany Cantu, PT 8/2/2017  3:45 PM

## 2017-08-04 ENCOUNTER — HOSPITAL ENCOUNTER (OUTPATIENT)
Dept: PHYSICAL THERAPY | Age: 72
Discharge: HOME | End: 2017-08-04
Payer: COMMERCIAL

## 2017-08-04 PROCEDURE — 97016 VASOPNEUMATIC DEVICE THERAPY: CPT | Performed by: PHYSICAL THERAPIST

## 2017-08-04 PROCEDURE — 97140 MANUAL THERAPY 1/> REGIONS: CPT | Performed by: PHYSICAL THERAPIST

## 2017-08-04 PROCEDURE — 97110 THERAPEUTIC EXERCISES: CPT | Performed by: PHYSICAL THERAPIST

## 2017-08-04 NOTE — PROGRESS NOTES
PT DAILY TREATMENT NOTE     Patient Name: Arleen Briggs  Date:2017  : 1945  [x]  Patient  Verified  Payor: Joyce Cargo / Plan: Carolina Wooten / Product Type: HMO /    In time:900 Out time:1008pm  Total Treatment Time (min): 68  Total Timed Codes (min):     Visit #: 6 of 12    Treatment Area: Unilateral primary osteoarthritis, left knee [M17.12]  Unspecified arthropathy, lower leg [M12.9]    SUBJECTIVE  Pain Level (0-10 scale): 2  Any medication changes, allergies to medications, adverse drug reactions, diagnosis change, or new procedure performed?: [x] No    [] Yes (see summary sheet for update)  Subjective functional status/changes:   [] No changes reported  Last night was the first night I was able to sleep without the ache. \"    OBJECTIVE  Modality rationale: decrease edema, decrease inflammation and decrease pain to improve the patients ability to perform functional mobility and improve activity  endurance     Min Type Additional Details    [] Estim: []Att   []Unatt        []TENS instruct                  []IFC  []Premod   []NMES                     []Other:  []w/US   []w/ice   []w/heat  Position:  Location:    []  Traction: [] Cervical       []Lumbar                       [] Prone          []Supine                       []Intermittent   []Continuous Lbs:  [] before manual  [] after manual    []  Ultrasound: []Continuous   [] Pulsed                           []1MHz   []3MHz Location:  W/cm2:    []  Iontophoresis with dexamethasone         Location: [] Take home patch   [] In clinic    []  Ice     []  heat  []  Ice massage Position:   Location:   15 [x]  Vasopneumatic Device Pressure:       [x] lo [] med [] hi   Temperature: [x] lo [] med [] hi   [x] Skin assessment post-treatment:  [x]intact []redness- no adverse reaction       []redness - adverse reaction:       33 min Therapeutic Exercise:  [] See flow sheet :   Rationale: increase ROM, increase strength and increase proprioception to improve the patients ability to ambulate I to perform ADLs and [facility] chores. 20 min Manual Therapy:  Flexion/extension mobes. PROM stretching in prone   Rationale: increase ROM and increase tissue extensibility to perform full AROM for transfers and ambulation of stairs           min Patient Education: [x] Review HEP    [] Progressed/Changed HEP based on:   [] positioning   [] body mechanics   [] transfers   [] heat/ice application        Other Objective/Functional Measures:   -14deg SLR lag, added 10 X for hip extension and stair lunge stretches. Added TG squats with good tolerance and VC for form     Pain Level (0-10 scale) post treatment: 2    ASSESSMENT/Changes in Function:  Patient has much less swelling noted today with AROM back to 100 deg flexion, continues to lack 10 deg Total extension. Increased tolerance to manual therapy today     Patient will continue to benefit from skilled PT services to modify and progress therapeutic interventions, address functional mobility deficits, address ROM deficits, address strength deficits, analyze and address soft tissue restrictions, analyze and cue movement patterns, analyze and modify body mechanics/ergonomics, assess and modify postural abnormalities, address imbalance/dizziness and instruct in home and community integration to attain remaining goals. []  See Plan of Care  []  See progress note/recertification  []  See Discharge Summary         Progress towards goals / Updated goals: · Short Term Goals: To be accomplished in  2  treatments:  1. Pt will be compliant with HEP for symptom management at home. -MET 7/31/17     · Long Term Goals: To be accomplished in  12  treatments:  1. Pt will demonstrate an increased FOTO score to 58/100 in order to improve function  2. Pt will be independent with HEP at D/C for self management.   3. Pt will demonstrate decreased edema in the left knee by 3cm in order to increase mobility for functional activities Not met 8-2-17 Swelling increased  4. Pt will demonstrate increased left knee mobility to WNLs in order to perform functional activities and bend more easily  5. Pt will demonstrate increased strength in the left knee to WNLs in order to increase her ability to ambulate in the community and negotiate stairs with decreased complaints.   6. Pt will be able to negotiate 12, 6in stairs with reciprocal gait and unilateral railing in order to access the second floor of her home with increased ease.       PLAN  [x]  Upgrade activities as tolerated     []  Continue plan of care  []  Update interventions per flow sheet       []  Discharge due to:_  []  Other:_      Steve Salazar, PT 8/4/2017  3:45 PM

## 2017-08-07 ENCOUNTER — HOSPITAL ENCOUNTER (OUTPATIENT)
Dept: PHYSICAL THERAPY | Age: 72
Discharge: HOME | End: 2017-08-07
Payer: COMMERCIAL

## 2017-08-07 PROCEDURE — 97110 THERAPEUTIC EXERCISES: CPT | Performed by: PHYSICAL THERAPIST

## 2017-08-07 PROCEDURE — 97016 VASOPNEUMATIC DEVICE THERAPY: CPT | Performed by: PHYSICAL THERAPIST

## 2017-08-07 PROCEDURE — 97140 MANUAL THERAPY 1/> REGIONS: CPT | Performed by: PHYSICAL THERAPIST

## 2017-08-07 NOTE — PROGRESS NOTES
PT DAILY TREATMENT NOTE 8-    Patient Name: Dajuan Avila  GKNL:[id number]  : 1945  [x]  Patient  Verified  Payor: Gema Germain / Plan: Armand Murray / Product Type: HMO /    In time: 900  Out time: 10:15am  Total Treatment Time (min):  75   Total Timed Codes (min):     Visit #: 7 of 12    Treatment Area: Unilateral primary osteoarthritis, left knee [M17.12]  Unspecified arthropathy, lower leg [M12.9]    SUBJECTIVE  Pain Level (0-10 scale): 8  Any medication changes, allergies to medications, adverse drug reactions, diagnosis change, or new procedure performed?: [x] No    [] Yes (see summary sheet for update)  Subjective functional status/changes:   [] No changes reported  \"I am not doing so well today . \"    OBJECTIVE  Modality rationale: decrease edema, decrease inflammation and decrease pain to improve the patients ability to perform functional mobility and improve activity  endurance     Min Type Additional Details    [] Estim: []Att   []Unatt        []TENS instruct                  []IFC  []Premod   []NMES                     []Other:  []w/US   []w/ice   []w/heat  Position:  Location:    []  Traction: [] Cervical       []Lumbar                       [] Prone          []Supine                       []Intermittent   []Continuous Lbs:  [] before manual  [] after manual    []  Ultrasound: []Continuous   [] Pulsed                           []1MHz   []3MHz Location:  W/cm2:    []  Iontophoresis with dexamethasone         Location: [] Take home patch   [] In clinic    []  Ice     []  heat  []  Ice massage Position:   Location:   15 [x]  Vasopneumatic Device Pressure:       [x] lo [] med [] hi   Temperature: [x] lo [] med [] hi   [x] Skin assessment post-treatment:  [x]intact []redness- no adverse reaction       []redness - adverse reaction:       40 min Therapeutic Exercise:  [] See flow sheet :   Rationale: increase ROM, increase strength and increase proprioception to improve the patients ability to ambulate I to perform ADLs and [facility] chores. 20 min Manual Therapy:  Flexion/extension mobes. PROM stretching in prone/supine/EOB   Rationale: increase ROM and increase tissue extensibility to perform full AROM for transfers and ambulation of stairs           min Patient Education: [x] Review HEP    [] Progressed/Changed HEP based on:   [] positioning   [] body mechanics   [] transfers   [] heat/ice application        Other Objective/Functional Measures:    -8 AROM extension, 105deg PROM flexion      Pain Level (0-10 scale) post treatment: 5    ASSESSMENT/Changes in Function: Patient is making  progress with ROM, and swelling is decreasing. Patient continues with mild tenderness and tightness in Med HS tendons, improvements noted in stair negotiations at home per patient report    Patient will continue to benefit from skilled PT services to modify and progress therapeutic interventions, address functional mobility deficits, address ROM deficits, address strength deficits, analyze and address soft tissue restrictions, analyze and cue movement patterns, analyze and modify body mechanics/ergonomics, assess and modify postural abnormalities, address imbalance/dizziness and instruct in home and community integration to attain remaining goals. []  See Plan of Care  []  See progress note/recertification  []  See Discharge Summary         Progress towards goals / Updated goals: · Short Term Goals: To be accomplished in  2  treatments:  1. Pt will be compliant with HEP for symptom management at home. -MET 7/31/17     · Long Term Goals: To be accomplished in  12  treatments:  1. Pt will demonstrate an increased FOTO score to 58/100 in order to improve function  2. Pt will be independent with HEP at D/C for self management. 3. Pt will demonstrate decreased edema in the left knee by 3cm in order to increase mobility for functional activities Not met  8-2-17 Swelling increased  4.  Pt will demonstrate increased left knee mobility to WNLs in order to perform functional activities and bend more easily  5. Pt will demonstrate increased strength in the left knee to WNLs in order to increase her ability to ambulate in the community and negotiate stairs with decreased complaints. 6. Pt will be able to negotiate 12, 6in stairs with reciprocal gait and unilateral railing in order to access the second floor of her home with increased ease.  Progressing 8--7-17       PLAN  [x]  Upgrade activities as tolerated     []  Continue plan of care  []  Update interventions per flow sheet       []  Discharge due to:_  []  Other:_      Adolfo Gan, PT 8/7/2017  3:45 PM

## 2017-08-09 ENCOUNTER — HOSPITAL ENCOUNTER (OUTPATIENT)
Dept: PHYSICAL THERAPY | Age: 72
Discharge: HOME | End: 2017-08-09
Payer: COMMERCIAL

## 2017-08-09 PROCEDURE — 97110 THERAPEUTIC EXERCISES: CPT | Performed by: PHYSICAL THERAPIST

## 2017-08-09 PROCEDURE — 97016 VASOPNEUMATIC DEVICE THERAPY: CPT | Performed by: PHYSICAL THERAPIST

## 2017-08-09 PROCEDURE — 97140 MANUAL THERAPY 1/> REGIONS: CPT | Performed by: PHYSICAL THERAPIST

## 2017-08-09 NOTE — PROGRESS NOTES
PT DAILY TREATMENT NOTE     Patient Name: Maren De Jesus  HQBA:[id number]  : 1945  [x]  Patient  Verified  Payor: Parish Wilcox / Plan: Shar Grover / Product Type: HMO /    In time: 0  Out time: 1015 am  Total Treatment Time (min):  80   Total Timed Codes (min):     Visit #: 8 of 12    Treatment Area: Unilateral primary osteoarthritis, left knee [M17.12]  Unspecified arthropathy, lower leg [M12.9]    SUBJECTIVE  Pain Level (0-10 scale): 1-2  Any medication changes, allergies to medications, adverse drug reactions, diagnosis change, or new procedure performed?: [x] No    [] Yes (see summary sheet for update)  Subjective functional status/changes:   [] No changes reported  \"Much better today. \"    OBJECTIVE  Modality rationale: decrease edema, decrease inflammation and decrease pain to improve the patients ability to perform functional mobility and improve activity  endurance     Min Type Additional Details    [] Estim: []Att   []Unatt        []TENS instruct                  []IFC  []Premod   []NMES                     []Other:  []w/US   []w/ice   []w/heat  Position:  Location:    []  Traction: [] Cervical       []Lumbar                       [] Prone          []Supine                       []Intermittent   []Continuous Lbs:  [] before manual  [] after manual    []  Ultrasound: []Continuous   [] Pulsed                           []1MHz   []3MHz Location:  W/cm2:    []  Iontophoresis with dexamethasone         Location: [] Take home patch   [] In clinic    []  Ice     []  heat  []  Ice massage Position:   Location:   15 [x]  Vasopneumatic Device Pressure:       [x] lo [] med [] hi   Temperature: [x] lo [] med [] hi   [x] Skin assessment post-treatment:  [x]intact []redness- no adverse reaction       []redness - adverse reaction:       45 min Therapeutic Exercise:  [] See flow sheet :   Rationale: increase ROM, increase strength and increase proprioception to improve the patients ability to ambulate I to perform ADLs and [facility] chores. 20 min Manual Therapy:  Flexion/extension mobes. PROM stretching in prone/supine/EOB,prone hangs with OP   Rationale: increase ROM and increase tissue extensibility to perform full AROM for transfers and ambulation of stairs           min Patient Education: [x] Review HEP    [] Progressed/Changed HEP based on:   [] positioning   [] body mechanics   [] transfers   [] heat/ice application        Other Objective/Functional Measures:    LTG 4: -5 AROM extension, 105deg PROM flexion     stairs with 2 HR: reciprocal (patient reports first time in 7 years she is able to do this), knee extension strength: 4+ break test, added prone hangs 4# for 3min    Pain Level (0-10 scale) post treatment: 0    ASSESSMENT/Changes in Function:  Patient is making  progress with extension  PROM from -8 to -5 ext, slow progress with flexion,  swelling is decreasing. Patient , improvements noted in getting in/out of car per  patient report    Patient will continue to benefit from skilled PT services to modify and progress therapeutic interventions, address functional mobility deficits, address ROM deficits, address strength deficits, analyze and address soft tissue restrictions, analyze and cue movement patterns, analyze and modify body mechanics/ergonomics, assess and modify postural abnormalities, address imbalance/dizziness and instruct in home and community integration to attain remaining goals. []  See Plan of Care  []  See progress note/recertification  []  See Discharge Summary         Progress towards goals / Updated goals: · Short Term Goals: To be accomplished in  2  treatments:  1. Pt will be compliant with HEP for symptom management at home. -MET 7/31/17     · Long Term Goals: To be accomplished in  12  treatments:  1. Pt will demonstrate an increased FOTO score to 58/100 in order to improve function  2. Pt will be independent with HEP at D/C for self management.   3. Pt will demonstrate decreased edema in the left knee by 3cm in order to increase mobility for functional activities Not met  8-2-17 Swelling increased  4. Pt will demonstrate increased left knee mobility to WNLs in order to perform functional activities and bend more easily 8-9-17   5. Pt will demonstrate increased strength in the left knee to WNLs in order to increase her ability to ambulate in the community and negotiate stairs with decreased complaints. 6. Pt will be able to negotiate 12, 6in stairs with reciprocal gait and unilateral railing in order to access the second floor of her home with increased ease.  Progressing 8--7-17       PLAN  [x]  Upgrade activities as tolerated     []  Continue plan of care  []  Update interventions per flow sheet       []  Discharge due to:_  []  Other:_      Freya Okeefe PT 8/9/2017  3:45 PM

## 2017-08-11 ENCOUNTER — HOSPITAL ENCOUNTER (OUTPATIENT)
Dept: PHYSICAL THERAPY | Age: 72
Discharge: HOME | End: 2017-08-11
Payer: COMMERCIAL

## 2017-08-11 PROCEDURE — 97140 MANUAL THERAPY 1/> REGIONS: CPT | Performed by: PHYSICAL THERAPIST

## 2017-08-11 PROCEDURE — 97016 VASOPNEUMATIC DEVICE THERAPY: CPT | Performed by: PHYSICAL THERAPIST

## 2017-08-11 PROCEDURE — 97110 THERAPEUTIC EXERCISES: CPT | Performed by: PHYSICAL THERAPIST

## 2017-08-11 NOTE — PROGRESS NOTES
PT DAILY TREATMENT NOTE     Patient Name: Brian Jacome  Date:2017  : 1945  [x]  Patient  Verified  Payor: Amita Lim / Plan: Janie Arlington / Product Type: HMO /    In time: 900  Out time: 1015 am  Total Treatment Time (min): 75    Total Timed Codes (min):     Visit #: 9 of 12    Treatment Area: Unilateral primary osteoarthritis, left knee [M17.12]  Unspecified arthropathy, lower leg [M12.9]    SUBJECTIVE  Pain Level (0-10 scale): 2  Any medication changes, allergies to medications, adverse drug reactions, diagnosis change, or new procedure performed?: [x] No    [] Yes (see summary sheet for update)  Subjective functional status/changes:   [] No changes reported  [facility] doctor reported he is not happy with my ROM, said it's the same as last time.   \"    OBJECTIVE  Modality rationale: decrease edema, decrease inflammation and decrease pain to improve the patients ability to perform functional mobility and improve activity  endurance     Min Type Additional Details    [] Estim: []Att   []Unatt        []TENS instruct                  []IFC  []Premod   []NMES                     []Other:  []w/US   []w/ice   []w/heat  Position:  Location:    []  Traction: [] Cervical       []Lumbar                       [] Prone          []Supine                       []Intermittent   []Continuous Lbs:  [] before manual  [] after manual    []  Ultrasound: []Continuous   [] Pulsed                           []1MHz   []3MHz Location:  W/cm2:    []  Iontophoresis with dexamethasone         Location: [] Take home patch   [] In clinic    []  Ice     []  heat  []  Ice massage Position:   Location:   15 [x]  Vasopneumatic Device Pressure:       [x] lo [] med [] hi   Temperature: [x] lo [] med [] hi   [x] Skin assessment post-treatment:  [x]intact []redness- no adverse reaction       []redness - adverse reaction:       40 min Therapeutic Exercise:  [] See flow sheet :   Rationale: increase ROM, increase strength and increase proprioception to improve the patients ability to ambulate I to perform ADLs and [facility] chores. 20 min Manual Therapy:  Flexion/extension mobes. PROM stretching in prone/supine/EOB,prone hangs with OP   Rationale: increase ROM and increase tissue extensibility to perform full AROM for transfers and ambulation of stairs           min Patient Education: [x] Review HEP    [] Progressed/Changed HEP based on:   [] positioning   [] body mechanics   [] transfers   [] heat/ice application        Other Objective/Functional Measures:    LTG 4: -5 AROM extension, 105deg PROM flexion , -12 SLR lag noted   Prone hangs with 5# for 5 min. Pain Level (0-10 scale) post treatment: 2    ASSESSMENT/Changes in Function:  Patient is making slow progress with extension/flexion AROM Improvements noted in functional mobility with stairs and transfers per pt report. AROM and TKE progressing slowly    Patient will continue to benefit from skilled PT services to modify and progress therapeutic interventions, address functional mobility deficits, address ROM deficits, address strength deficits, analyze and address soft tissue restrictions, analyze and cue movement patterns, analyze and modify body mechanics/ergonomics, assess and modify postural abnormalities, address imbalance/dizziness and instruct in home and community integration to attain remaining goals. []  See Plan of Care  []  See progress note/recertification  []  See Discharge Summary         Progress towards goals / Updated goals: · Short Term Goals: To be accomplished in  2  treatments:  1. Pt will be compliant with HEP for symptom management at home. -MET 7/31/17     · Long Term Goals: To be accomplished in  12  treatments:  1. Pt will demonstrate an increased FOTO score to 58/100 in order to improve function  2. Pt will be independent with HEP at D/C for self management.   3. Pt will demonstrate decreased edema in the left knee by 3cm in order to increase mobility for functional activities  46cm  at IE  4. Pt will demonstrate increased left knee mobility to WNLs in order to perform functional activities and bend more easily  8-9-17 not met  5. Pt will demonstrate increased strength in the left knee to WNLs in order to increase her ability to ambulate in the community and negotiate stairs with decreased complaints. 6. Pt will be able to negotiate 12, 6in stairs with reciprocal gait and unilateral railing in order to access the second floor of her home with increased ease.  Progressing 8--7-17       PLAN  [x]  Upgrade activities as tolerated     []  Continue plan of care  []  Update interventions per flow sheet       []  Discharge due to:_  []  Other:_      Anca Greenberg, PT 8/11/2017  3:45 PM

## 2017-08-14 ENCOUNTER — HOSPITAL ENCOUNTER (OUTPATIENT)
Dept: PHYSICAL THERAPY | Age: 72
Discharge: HOME | End: 2017-08-14
Payer: COMMERCIAL

## 2017-08-14 PROCEDURE — 97140 MANUAL THERAPY 1/> REGIONS: CPT

## 2017-08-14 PROCEDURE — 97014 ELECTRIC STIMULATION THERAPY: CPT

## 2017-08-14 PROCEDURE — 97110 THERAPEUTIC EXERCISES: CPT

## 2017-08-14 NOTE — PROGRESS NOTES
PT DAILY TREATMENT NOTE     Patient Name: Paul Clark  Date:2017  : 1945  [x]  Patient  Verified  Payor: Janice Gurpreet / Plan: Wood Boone / Product Type: HMO /    In time: 9:00  Out time: 10:07  Total Treatment Time (min): 67    Total Timed Codes (min):     Visit #: 10 of 12    Treatment Area: Unilateral primary osteoarthritis, left knee [M17.12]  Unspecified arthropathy, lower leg [M12.9]    SUBJECTIVE  Pain Level (0-10 scale): 0/10  Any medication changes, allergies to medications, adverse drug reactions, diagnosis change, or new procedure performed?: [x] No    [] Yes (see summary sheet for update)  Subjective functional status/changes:   [] No changes reported  Pt reports that her and her  worked hard on Saturday at straightening her leg. OBJECTIVE  Modality rationale: decrease edema, decrease inflammation and decrease pain to improve the patients ability to perform functional mobility and improve activity  endurance     Min Type Additional Details   10 [x] Estim: []Att   []Unatt        []TENS instruct                  []IFC  []Premod   [x]NMES                     []Other:  []w/US   [x]w/ice   []w/heat  Position:Supine with towel roll under heel for ext stretch. Location: 10\"on:10\"off with active quad set.     []  Traction: [] Cervical       []Lumbar                       [] Prone          []Supine                       []Intermittent   []Continuous Lbs:  [] before manual  [] after manual    []  Ultrasound: []Continuous   [] Pulsed                           []1MHz   []3MHz Location:  W/cm2:    []  Iontophoresis with dexamethasone         Location: [] Take home patch   [] In clinic    []  Ice     []  heat  []  Ice massage Position:   Location:    [x]  Vasopneumatic Device Pressure:       [x] lo [] med [] hi   Temperature: [x] lo [] med [] hi   [x] Skin assessment post-treatment:  [x]intact []redness- no adverse reaction       []redness - adverse reaction:       42 min Therapeutic Exercise:  [] See flow sheet :   Rationale: increase ROM, increase strength and increase proprioception to improve the patients ability to ambulate I to perform ADLs and [facility] chores. 15 min Manual Therapy:  Flexion/extension mobes. PROM stretching in prone/supine/prone hangs with OP   Rationale: increase ROM and increase tissue extensibility to perform full AROM for transfers and ambulation of stairs           min Patient Education: [x] Review HEP    [] Progressed/Changed HEP based on:   [] positioning   [] body mechanics   [] transfers   [] heat/ice application        Other Objective/Functional Measures:    LTG 4: -10 AROM prior to prone hang/-4 PROM extension, 108deg AROM/ 111 PROM flexion , -10 SLR lag noted   Mid patella girth measured in cm: L= 44 v R= 42.5      Pain Level (0-10 scale) post treatment: 0    ASSESSMENT/Changes in Function:  Patient is making slow progress with flexion, but has made gains with extension. Initiated NMES for quad strengthening. Advised pt to continue using ice at home as swelling may inhibit ROM. Patient will continue to benefit from skilled PT services to modify and progress therapeutic interventions, address functional mobility deficits, address ROM deficits, address strength deficits, analyze and address soft tissue restrictions, analyze and cue movement patterns, analyze and modify body mechanics/ergonomics, assess and modify postural abnormalities, address imbalance/dizziness and instruct in home and community integration to attain remaining goals. []  See Plan of Care  []  See progress note/recertification  []  See Discharge Summary         Progress towards goals / Updated goals: · Short Term Goals: To be accomplished in  2  treatments:  1. Pt will be compliant with HEP for symptom management at home. -MET 7/31/17     · Long Term Goals: To be accomplished in  12  treatments:  1.  Pt will demonstrate an increased FOTO score to 58/100 in order to improve function  2. Pt will be independent with HEP at D/C for self management. 3. Pt will demonstrate decreased edema in the left knee by 3cm in order to increase mobility for functional activities-Progressing  46cm  at IE. Currently 44cm  4. Pt will demonstrate increased left knee mobility to WNLs in order to perform functional activities and bend more easily  8-9-17 not met  5. Pt will demonstrate increased strength in the left knee to WNLs in order to increase her ability to ambulate in the community and negotiate stairs with decreased complaints. 6. Pt will be able to negotiate 12, 6in stairs with reciprocal gait and unilateral railing in order to access the second floor of her home with increased ease.  Progressing 8--7-17       PLAN  [x]  Upgrade activities as tolerated     []  Continue plan of care  []  Update interventions per flow sheet       []  Discharge due to:_  []  Other:_      Melissa Harvey 8/14/2017  3:45 PM

## 2017-08-16 ENCOUNTER — HOSPITAL ENCOUNTER (OUTPATIENT)
Dept: PHYSICAL THERAPY | Age: 72
Discharge: HOME | End: 2017-08-16
Payer: COMMERCIAL

## 2017-08-16 PROCEDURE — 97014 ELECTRIC STIMULATION THERAPY: CPT

## 2017-08-16 PROCEDURE — 97110 THERAPEUTIC EXERCISES: CPT

## 2017-08-16 PROCEDURE — 97140 MANUAL THERAPY 1/> REGIONS: CPT

## 2017-08-16 NOTE — PROGRESS NOTES
PT DAILY TREATMENT NOTE     Patient Name: Jeff Lovelace  Date:2017  : 1945  [x]  Patient  Verified  Payor: Rimma Levels / Plan: Lala Husain / Product Type: HMO /    In time:9:00 Out time: 10:05   Total Treatment Time (min): 65     Visit #:     Treatment Area: Unilateral primary osteoarthritis, left knee [M17.12]  Unspecified arthropathy, lower leg [M12.9]    SUBJECTIVE  Pain Level (0-10 scale): 3-4 /10  Any medication changes, allergies to medications, adverse drug reactions, diagnosis change, or new procedure performed?: [x] No    [] Yes (see summary sheet for update)  Subjective functional status/changes:   [] No changes reported  \"I am getting frustrated, it won't bend. \"     OBJECTIVE  Modality rationale: decrease edema, decrease inflammation and decrease pain to improve the patients ability to perform functional mobility and improve activity  endurance   Min Type Additional Details   10 [x] Estim: []Att   []Unatt        []TENS instruct                  []IFC  []Premod   [x]NMES                     []Other:  []w/US   [x]w/ice   []w/heat  Position:Supine with towel roll under heel for ext stretch. Location: 10\"on:10\"off with active quad set.     []  Traction: [] Cervical       []Lumbar                       [] Prone          []Supine                       []Intermittent   []Continuous Lbs:  [] before manual  [] after manual    []  Ultrasound: []Continuous   [] Pulsed                           []1MHz   []3MHz Location:  W/cm2:    []  Iontophoresis with dexamethasone         Location: [] Take home patch   [] In clinic    []  Ice     []  heat  []  Ice massage Position:   Location:    [x]  Vasopneumatic Device Pressure:       [x] lo [] med [] hi   Temperature: [x] lo [] med [] hi   [x] Skin assessment post-treatment:  [x]intact []redness- no adverse reaction       []redness - adverse reaction:     45 min Therapeutic Exercise:  [x] See flow sheet :   Rationale: increase ROM, increase strength and increase proprioception to improve the patients ability to ambulate I to perform ADLs and [facility] chores. 10 min Manual Therapy:  Flexion/extension mobs. PROM stretching in prone/supine/prone hangs with OP   Rationale: increase ROM and increase tissue extensibility to perform full AROM for transfers and ambulation of stairs          X min Patient Education: [x] Review HEP          Other Objective/Functional Measures:    LTG 4: -10 AROM prior to prone hang/-4 PROM extension, 108deg AROM/ 111 PROM flexion ,   -8 SLR lag noted   Added SAQs, SL hip abd, knee flex st @ stairs. Pain Level (0-10 scale) post treatment: 0    ASSESSMENT/Changes in Function: Slow to minimal progress with flexion, but has made gains with extension though note 8deg lag with SLR. Added knee flex st @ stairs to improve flex and SAQs with cues for TKE. Patient will continue to benefit from skilled PT services to modify and progress therapeutic interventions, address functional mobility deficits, address ROM deficits, address strength deficits, analyze and address soft tissue restrictions, analyze and cue movement patterns, analyze and modify body mechanics/ergonomics, assess and modify postural abnormalities, address imbalance/dizziness and instruct in home and community integration to attain remaining goals. []  See Plan of Care  []  See progress note/recertification  []  See Discharge Summary         Progress towards goals / Updated goals: · Short Term Goals: To be accomplished in  2  treatments:  1. Pt will be compliant with HEP for symptom management at home. -MET 7/31/17     · Long Term Goals: To be accomplished in  12  treatments:  1. Pt will demonstrate an increased FOTO score to 58/100 in order to improve function  2. Pt will be independent with HEP at D/C for self management.   3. Pt will demonstrate decreased edema in the left knee by 3cm in order to increase mobility for functional activities-Progressing 46cm  at IE. Currently 44cm  4. Pt will demonstrate increased left knee mobility to WNLs in order to perform functional activities and bend more easily  8-9-17 not met  5. Pt will demonstrate increased strength in the left knee to WNLs in order to increase her ability to ambulate in the community and negotiate stairs with decreased complaints. 6. Pt will be able to negotiate 12, 6in stairs with reciprocal gait and unilateral railing in order to access the second floor of her home with increased ease. Progressing 8--7-17       PLAN  [x]  Upgrade activities as tolerated     []  Continue plan of care  []  Update interventions per flow sheet       []  Discharge due to:_  [x]  Other:_  PN needed NV.    Denisha Raymundo V, PTA 8/16/2017  3:45 PM

## 2017-08-18 ENCOUNTER — HOSPITAL ENCOUNTER (OUTPATIENT)
Dept: PHYSICAL THERAPY | Age: 72
Discharge: HOME | End: 2017-08-18
Payer: COMMERCIAL

## 2017-08-18 PROCEDURE — 97140 MANUAL THERAPY 1/> REGIONS: CPT

## 2017-08-18 PROCEDURE — 97110 THERAPEUTIC EXERCISES: CPT

## 2017-08-18 PROCEDURE — 97014 ELECTRIC STIMULATION THERAPY: CPT

## 2017-08-18 NOTE — PROGRESS NOTES
PT DAILY TREATMENT NOTE     Patient Name: Ailin Douglass  QIPV:  : 1945  [x]  Patient  Verified  Payor: Urszula Neal / Plan: Michael Lees / Product Type: HMO /    In time: 9:00 Out time: 10:07   Total Treatment Time (min): 79     Visit #: 12 of 12    Treatment Area: Unilateral primary osteoarthritis, left knee [M17.12]  Unspecified arthropathy, lower leg [M12.9]    SUBJECTIVE  Pain Level (0-10 scale): 0 /10  Any medication changes, allergies to medications, adverse drug reactions, diagnosis change, or new procedure performed?: [x] No    [] Yes (see summary sheet for update)  Subjective functional status/changes:   [] No changes reported  See PN    OBJECTIVE  Modality rationale: decrease edema, decrease inflammation and decrease pain to improve the patients ability to perform functional mobility and improve activity  endurance   Min Type Additional Details   10 [x] Estim: []Att   []Unatt        []TENS instruct                  []IFC  []Premod   [x]NMES                     []Other:  []w/US   [x]w/ice   []w/heat  Position:Supine with towel roll under heel for ext stretch. Location: 10\"on:10\"off with active quad set.     []  Traction: [] Cervical       []Lumbar                       [] Prone          []Supine                       []Intermittent   []Continuous Lbs:  [] before manual  [] after manual    []  Ultrasound: []Continuous   [] Pulsed                           []1MHz   []3MHz Location:  W/cm2:    []  Iontophoresis with dexamethasone         Location: [] Take home patch   [] In clinic    []  Ice     []  heat  []  Ice massage Position:   Location:   ND [x]  Vasopneumatic Device Pressure:       [x] lo [] med [] hi   Temperature: [x] lo [] med [] hi   [x] Skin assessment post-treatment:  [x]intact []redness- no adverse reaction       []redness - adverse reaction:     42 min Therapeutic Exercise:  [x] See flow sheet :(-5 min TM)   Rationale: increase ROM, increase strength and increase proprioception to improve the patients ability to ambulate I to perform ADLs and [facility] chores. 10 min Manual Therapy:  Flexion/extension mobs. PROM stretching in prone/supine/prone hangs with OP   Rationale: increase ROM and increase tissue extensibility to perform full AROM for transfers and ambulation of stairs          X min Patient Education: [x] Review HEP          Other Objective/Functional Measures:   See PN      Pain Level (0-10 scale) post treatment: 0    ASSESSMENT/Changes in Function:   See PN      Patient will continue to benefit from skilled PT services to modify and progress therapeutic interventions, address functional mobility deficits, address ROM deficits, address strength deficits, analyze and address soft tissue restrictions, analyze and cue movement patterns, analyze and modify body mechanics/ergonomics, assess and modify postural abnormalities, address imbalance/dizziness and instruct in home and community integration to attain remaining goals. []  See Plan of Care  []  See progress note/recertification  []  See Discharge Summary         Progress towards goals / Updated goals:  See PN       PLAN  [x]  Upgrade activities as tolerated     []  Continue plan of care  []  Update interventions per flow sheet       []  Discharge due to:_  []  Other:_    Raman Harvey, PT 8/18/2017  3:45 PM

## 2017-08-18 NOTE — PROGRESS NOTES
[address]  [address], [place], [place]  Phone [phone]  Fax [phone]  PROGRESS NOTE  Patient Name: Иван Mckenzie : 1945   Treatment/Medical Diagnosis: Unilateral primary osteoarthritis, left knee [M17.12]  Unspecified arthropathy, lower leg [M12.9]   Referral Source: Fabien Villar MD     Date of Initial Visit: 17 Attended Visits: 12 Missed Visits: 0     SUMMARY OF TREATMENT  Physical therapy has consisted of therapeutic exercises for increased strength/ROM/flexibility. Manual therapy for increased ROM/flexibility and decreased muscle hypertonicity. NMES provided for improved quad strength. MHP/CP/vaso provided for palliative. CURRENT STATUS  Pt rates overall improvement as 30% with functional activities since [facility]. Increased knee extension PROM by 5 deg., minimal objective change yet into knee flexion. Current improvements: Pt reports \"walking is a lot more comfortable than it used to be. \"  Increased ease to get in/out of cars  Current objective impairments:  Average pain= 1 \"achey\". Worst pain= 2-3/10. MMT:  Knee flex= 4+. Knee ext=5, though pt with impaired QS and 8 deg lag with SLR. AROM/PROM: (-9)/(-3) to 95/101 degrees. Current functional limitations: Pt reports \"I still can't bend it well enough\". Non-reciprocal stairs. Walking limited to ~30 minutes in grocery store with shopping cart. Goal/Measure of Progress Goal Met? 1. Pt will be compliant with HEP for symptom management at home. Status at last Eval: n/a Current Status: 2 x/day yes   2. Pt will demonstrate an increased FOTO score to 58/100 in order to improve function   Status at last Eval: 38 Current Status: 49 In progress     New Goals to be achieved in __4__  weeks:  1. Pt will be independent with HEP at D/C for self management.    2.  Pt will demonstrate decreased edema in the left knee by 3cm in order to increase mobility for functional activities   3. Pt will demonstrate increased left knee mobility to WNLs in order to perform functional activities and bend more easily   4. Pt will demonstrate increased strength in the left knee to WNLs in order to increase her ability to ambulate in the community and negotiate stairs with decreased complaints. 5.  Pt will be able to negotiate 12, 6 in stairs with reciprocal gait and unilateral railing in order to access the second floor of her home with increased ease. RECOMMENDATIONS  Pt requires progression of skilled PT to promote attainment of above stated goals. Recommend continued PT at 2-3 x/wk for additional 4 weeks, with careful monitoring for potential plateau in progress. If you have any questions/comments please contact us directly at [phone]. Thank you for allowing us to assist in the care of your patient. Therapist Signature: Regine Valderrama. Candice, PT Date: 8/18/2017     Time: 8:52 AM   NOTE TO PHYSICIAN:  PLEASE COMPLETE THE ORDERS BELOW AND FAX TO   [facility] [place]: [phone]. If you are unable to process this request in 24 hours please contact our office: [phone].    ___ I have read the above report and request that my patient continue as recommended.   ___ I have read the above report and request that my patient continue therapy with the following changes/special instructions:_________________________________________________________   ___ I have read the above report and request that my patient be discharged from therapy.      Physician Signature:        Date:       Time:

## 2017-08-21 ENCOUNTER — HOSPITAL ENCOUNTER (OUTPATIENT)
Dept: PHYSICAL THERAPY | Age: 72
Discharge: HOME | End: 2017-08-21
Payer: COMMERCIAL

## 2017-08-21 PROCEDURE — 97140 MANUAL THERAPY 1/> REGIONS: CPT

## 2017-08-21 PROCEDURE — 97014 ELECTRIC STIMULATION THERAPY: CPT

## 2017-08-21 PROCEDURE — 97110 THERAPEUTIC EXERCISES: CPT

## 2017-08-21 NOTE — PROGRESS NOTES
PT DAILY TREATMENT NOTE     Patient Name: Blas Lynn  Date:2017  : 1945  [x]  Patient  Verified  Payor: Qian Mariano / Plan: Pao Brasher / Product Type: HMO /    In time: 10:00 Out time: 11:06  Total Treatment Time (min): 66   Visit #: 13 of 12 (24)  Treatment Area: Unilateral primary osteoarthritis, left knee [M17.12]  Unspecified arthropathy, lower leg [M12.9]    SUBJECTIVE  Pain Level (0-10 scale):  0/10 (achey)  Any medication changes, allergies to medications, adverse drug reactions, diagnosis change, or new procedure performed?: [x] No    [] Yes (see summary sheet for update)  Subjective functional status/changes:   [] No changes reported  \"I worked on my bend a lot this weekend. I think it is better. \"    OBJECTIVE  Modality rationale: decrease edema, decrease inflammation and decrease pain to improve the patients ability to perform functional mobility and improve activity  endurance   Min Type Additional Details   10 [x] Estim: []Att   []Unatt        []TENS instruct                  []IFC  []Premod   [x]NMES                     []Other:  []w/US   [x]w/ice   []w/heat  Position:Supine with towel roll under heel for ext stretch.   Location: 10\"on:10\"off; SAQ    []  Traction: [] Cervical       []Lumbar                       [] Prone          []Supine                       []Intermittent   []Continuous Lbs:  [] before manual  [] after manual    []  Ultrasound: []Continuous   [] Pulsed                           []1MHz   []3MHz Location:  W/cm2:    []  Iontophoresis with dexamethasone         Location: [] Take home patch   [] In clinic    []  Ice     []  heat  []  Ice massage Position:   Location:    []  Vasopneumatic Device Pressure:       [] lo [] med [] hi   Temperature: [] lo [] med [] hi   [x] Skin assessment post-treatment:  [x]intact []redness- no adverse reaction       []redness - adverse reaction:     46 min Therapeutic Exercise:  [x] See flow sheet :   Rationale: increase ROM, increase strength and increase proprioception to improve the patients ability to ambulate I to perform ADLs and [facility] chores. 10 min Manual Therapy:  EOB flex with distraction. PROM stretching in prone and supine   Rationale: increase ROM and increase tissue extensibility to perform full AROM for transfers and ambulation of stairs          X min Patient Education: [x] Review HEP          Other Objective/Functional Measures:  R kNee A/PROM: -9/-3 to 95/101 deg  Added step downs. Pain Level (0-10 scale) post treatment: 0    ASSESSMENT/Changes in Function: 8 ext lag noted with SLR. Pt unable to perform reciprocal pattern with stair negotiation and lacks quad control for ecc lowering on step downs. Progressed PREs to address LE strength deficits. Discussed pushing ROM @ home to improve knee flex; use of recumbent bike, stair and chair stretch as well as prone with good pt understanding. Patient will continue to benefit from skilled PT services to modify and progress therapeutic interventions, address functional mobility deficits, address ROM deficits, address strength deficits, analyze and address soft tissue restrictions, analyze and cue movement patterns, analyze and modify body mechanics/ergonomics, assess and modify postural abnormalities, address imbalance/dizziness and instruct in home and community integration to attain remaining goals. []  See Plan of Care  []  See progress note/recertification  []  See Discharge Summary         Progress towards goals / Updated goals:   Working towards newly established goals.        PLAN  [x]  Upgrade activities as tolerated     []  Continue plan of care  []  Update interventions per flow sheet       []  Discharge due to:_  []  Other:_    Lili Jackson V, PTA 8/21/2017  3:45 PM

## 2017-08-25 ENCOUNTER — HOSPITAL ENCOUNTER (OUTPATIENT)
Dept: PHYSICAL THERAPY | Age: 72
Discharge: HOME | End: 2017-08-25
Payer: COMMERCIAL

## 2017-08-25 PROCEDURE — 97110 THERAPEUTIC EXERCISES: CPT

## 2017-08-25 PROCEDURE — 97014 ELECTRIC STIMULATION THERAPY: CPT

## 2017-08-25 PROCEDURE — 97140 MANUAL THERAPY 1/> REGIONS: CPT

## 2017-08-25 NOTE — PROGRESS NOTES
PT DAILY TREATMENT NOTE     Patient Name: Faisal Segura  Date:2017  : 1945  [x]  Patient  Verified  Payor: Jorge Hodges / Plan: Corry Colon / Product Type: HMO /    In time: 8:35  Out time: 9:42  Total Treatment Time (min): 79   Visit #: 2 of   Treatment Area: Unilateral primary osteoarthritis, left knee [M17.12]  Unspecified arthropathy, lower leg [M12.9]    SUBJECTIVE  Pain Level (0-10 scale):  stiffness  Any medication changes, allergies to medications, adverse drug reactions, diagnosis change, or new procedure performed?: [x] No    [] Yes (see summary sheet for update)  Subjective functional status/changes:   [] No changes reported    I am stiff today but I was proud of myself because [place] walk [place] and did not even have to use my cane   OBJECTIVE  Modality rationale: decrease edema, decrease inflammation and decrease pain to improve the patients ability to perform functional mobility and improve activity  endurance   Min Type Additional Details   10 [x] Estim: []Att   []Unatt        []TENS instruct                  []IFC  []Premod   [x]NMES                     []Other:  []w/US   [x]w/ice   []w/heat  Position:Supine with towel roll under heel for ext stretch.   Location: 10\"on:10\"off; SAQ    []  Traction: [] Cervical       []Lumbar                       [] Prone          []Supine                       []Intermittent   []Continuous Lbs:  [] before manual  [] after manual    []  Ultrasound: []Continuous   [] Pulsed                           []1MHz   []3MHz Location:  W/cm2:    []  Iontophoresis with dexamethasone         Location: [] Take home patch   [] In clinic    []  Ice     []  heat  []  Ice massage Position:   Location:    []  Vasopneumatic Device Pressure:       [] lo [] med [] hi   Temperature: [] lo [] med [] hi   [x] Skin assessment post-treatment:  [x]intact []redness- no adverse reaction       []redness - adverse reaction: 42/37 min Therapeutic Exercise:  [x] See flow sheet :   Rationale: increase ROM, increase strength and increase proprioception to improve the patients ability to ambulate I to perform ADLs and [facility] chores. 15 min Manual Therapy:  prone flex with distraction. PROM stretching in supine with patella mobs   Rationale: increase ROM and increase tissue extensibility to perform full AROM for transfers and ambulation of stairs          X min Patient Education: [x] Review HEP          Other Objective/Functional Measures: able to achieve 105 after manual with AAROM - progressing towards LTG#3    Pain Level (0-10 scale) post treatment: 0    ASSESSMENT/Changes in Function:     Patient will continue to benefit from skilled PT services to modify and progress therapeutic interventions, address functional mobility deficits, address ROM deficits, address strength deficits, analyze and address soft tissue restrictions, analyze and cue movement patterns, analyze and modify body mechanics/ergonomics, assess and modify postural abnormalities, address imbalance/dizziness and instruct in home and community integration to attain remaining goals. [x]  See Plan of Care  []  See progress note/recertification  []  See Discharge Summary         Progress towards goals / Updated goals:   New Goals to be achieved in __4__  weeks:  1. Pt will be independent with HEP at D/C for self management. 2.  Pt will demonstrate decreased edema in the left knee by 3cm in order to increase mobility for functional activities   3. Pt will demonstrate increased left knee mobility to WNLs in order to perform functional activities and bend more easily - progressing towards 8/25/2017   4. Pt will demonstrate increased strength in the left knee to WNLs in order to increase her ability to ambulate in the community and negotiate stairs with decreased complaints.      5.   Pt will be able to negotiate 12, 6 in stairs with reciprocal gait and unilateral railing in order to access the second floor of her home with increased ease.        PLAN  [x]  Upgrade activities as tolerated     []  Continue plan of care  []  Update interventions per flow sheet       []  Discharge due to:_  []  Other:_    Godwin Camp, PTA 8/25/2017  3:45 PM

## 2017-09-05 ENCOUNTER — HOSPITAL ENCOUNTER (OUTPATIENT)
Dept: PHYSICAL THERAPY | Age: 72
Discharge: HOME | End: 2017-09-05
Payer: COMMERCIAL

## 2017-09-05 PROCEDURE — 97140 MANUAL THERAPY 1/> REGIONS: CPT

## 2017-09-05 PROCEDURE — 97110 THERAPEUTIC EXERCISES: CPT

## 2017-09-05 PROCEDURE — 97016 VASOPNEUMATIC DEVICE THERAPY: CPT

## 2017-09-05 NOTE — PROGRESS NOTES
PT DAILY TREATMENT NOTE     Patient Name: Isaiah Polanco  Date:2017  : 1945  [x]  Patient  Verified  Payor: Harrison Long / Plan: Rick Oscar / Product Type: HMO /    In time: 9:30  Out time: 10:42  Total Treatment Time (min): 72   Visit #: 3 of   Treatment Area: Unilateral primary osteoarthritis, left knee [M17.12]  Unspecified arthropathy, lower leg [M12.9]    SUBJECTIVE  Pain Level (0-10 scale):  Stiffness 1-2/10  Any medication changes, allergies to medications, adverse drug reactions, diagnosis change, or new procedure performed?: [x] No    [] Yes (see summary sheet for update)  Subjective functional status/changes:   [] No changes reported    I went out of town this weekend and I am afraid I took a couple of step backwards - it was really swollen feeling and tight     OBJECTIVE  Modality rationale: decrease edema, decrease inflammation and decrease pain to improve the patients ability to perform functional mobility and improve activity  endurance   Min Type Additional Details    [x] Estim: []Att   []Unatt        []TENS instruct                  []IFC  []Premod   [x]NMES                     []Other:  []w/US   [x]w/ice   []w/heat  Position:Supine with towel roll under heel for ext stretch.   Location: 10\"on:10\"off; SAQ    []  Traction: [] Cervical       []Lumbar                       [] Prone          []Supine                       []Intermittent   []Continuous Lbs:  [] before manual  [] after manual    []  Ultrasound: []Continuous   [] Pulsed                           []1MHz   []3MHz Location:  W/cm2:    []  Iontophoresis with dexamethasone         Location: [] Take home patch   [] In clinic    []  Ice     []  heat  []  Ice massage Position:   Location:   15 [x]  Vasopneumatic Device Pressure:       [x] lo [x] med [] hi   Temperature: [x] lo [] med [] hi   [x] Skin assessment post-treatment:  [x]intact []redness- no adverse reaction       []redness - adverse reaction:     42/32 min Therapeutic Exercise:  [x] See flow sheet :10 min NC for warm up and stretching    Rationale: increase ROM, increase strength and increase proprioception to improve the patients ability to ambulate I to perform ADLs and [facility] chores. 15 min Manual Therapy:  prone flex with distraction. PROM stretching in supine with patella mobs   Rationale: increase ROM and increase tissue extensibility to perform full AROM for transfers and ambulation of stairs          X min Patient Education: [x] Review HEP          Other Objective/Functional Measures:   108 AAROM        Changed from e-stim to vaso to assist with decrease pain and edema        LTG#1 - MET - patient reported stretching several times a day while       out of town - noted increase with ROM    Pain Level (0-10 scale) post treatment: 0    ASSESSMENT/Changes in Function:     Patient will continue to benefit from skilled PT services to modify and progress therapeutic interventions, address functional mobility deficits, address ROM deficits, address strength deficits, analyze and address soft tissue restrictions, analyze and cue movement patterns, analyze and modify body mechanics/ergonomics, assess and modify postural abnormalities, address imbalance/dizziness and instruct in home and community integration to attain remaining goals. [x]  See Plan of Care  []  See progress note/recertification  []  See Discharge Summary         Progress towards goals / Updated goals:   New Goals to be achieved in __4__  weeks:  1. Pt will be independent with HEP at D/C for self management. MET 9/5/17   2. Pt will demonstrate decreased edema in the left knee by 3cm in order to increase mobility for functional activities   3. Pt will demonstrate increased left knee mobility to WNLs in order to perform functional activities and bend more easily - progressing towards 8/25/2017   4.   Pt will demonstrate increased strength in the left knee to WNLs in order to increase her ability to ambulate in the community and negotiate stairs with decreased complaints.      5.   Pt will be able to negotiate 12, 6 in stairs with reciprocal gait and unilateral railing in order to access the second floor of her home with increased ease.        PLAN  [x]  Upgrade activities as tolerated     []  Continue plan of care  []  Update interventions per flow sheet       []  Discharge due to:_  []  Other:_    Glorine Morning, PTA 9/5/2017  3:45 PM

## 2017-09-08 ENCOUNTER — HOSPITAL ENCOUNTER (OUTPATIENT)
Dept: PHYSICAL THERAPY | Age: 72
Discharge: HOME | End: 2017-09-08
Payer: COMMERCIAL

## 2017-09-08 PROCEDURE — 97110 THERAPEUTIC EXERCISES: CPT

## 2017-09-08 PROCEDURE — 97016 VASOPNEUMATIC DEVICE THERAPY: CPT

## 2017-09-08 PROCEDURE — 97140 MANUAL THERAPY 1/> REGIONS: CPT

## 2017-09-08 NOTE — PROGRESS NOTES
PT DAILY TREATMENT NOTE     Patient Name: Landon Delarosa  Date:2017  : 1945  [x]  Patient  Verified  Payor: [place] Jeannie / Plan: Parker Cap / Product Type: HMO /    In time: 9:30  Out time: 10:35  Total Treatment Time (min): 65   Visit #: 4 of   Treatment Area: Unilateral primary osteoarthritis, left knee [M17.12]  Unspecified arthropathy, lower leg [M12.9]    SUBJECTIVE  Pain Level (0-10 scale):  stiff/10  Any medication changes, allergies to medications, adverse drug reactions, diagnosis change, or new procedure performed?: [x] No    [] Yes (see summary sheet for update)  Subjective functional status/changes:   [] No changes reported    It just feels so tight in the joint     OBJECTIVE  Modality rationale: decrease edema, decrease inflammation and decrease pain to improve the patients ability to perform functional mobility and improve activity  endurance   Min Type Additional Details    [x] Estim: []Att   []Unatt        []TENS instruct                  []IFC  []Premod   [x]NMES                     []Other:  []w/US   [x]w/ice   []w/heat  Position:Supine with towel roll under heel for ext stretch.   Location: 10\"on:10\"off; SAQ    []  Traction: [] Cervical       []Lumbar                       [] Prone          []Supine                       []Intermittent   []Continuous Lbs:  [] before manual  [] after manual    []  Ultrasound: []Continuous   [] Pulsed                           []1MHz   []3MHz Location:  W/cm2:    []  Iontophoresis with dexamethasone         Location: [] Take home patch   [] In clinic    []  Ice     []  heat  []  Ice massage Position:   Location:   15 [x]  Vasopneumatic Device Pressure:       [x] lo [x] med [] hi   Temperature: [x] lo [] med [] hi   [x] Skin assessment post-treatment:  [x]intact []redness- no adverse reaction       []redness - adverse reaction:     35/30 min Therapeutic Exercise:  [x] See flow sheet :5 min NC for warm up and stretching    Rationale: increase ROM, increase strength and increase proprioception to improve the patients ability to ambulate I to perform ADLs and [facility] chores. 15 min Manual Therapy:  prone flex with distraction. PROM stretching in supine with patella mobs   Rationale: increase ROM and increase tissue extensibility to perform full AROM for transfers and ambulation of stairs          X min Patient Education: [x] Review HEP          Other Objective/Functional Measures:   110 AAROM            Pain Level (0-10 scale) post treatment: 0    ASSESSMENT/Changes in Function:     Patient will continue to benefit from skilled PT services to modify and progress therapeutic interventions, address functional mobility deficits, address ROM deficits, address strength deficits, analyze and address soft tissue restrictions, analyze and cue movement patterns, analyze and modify body mechanics/ergonomics, assess and modify postural abnormalities, address imbalance/dizziness and instruct in home and community integration to attain remaining goals. [x]  See Plan of Care  []  See progress note/recertification  []  See Discharge Summary         Progress towards goals / Updated goals:   New Goals to be achieved in __4__  weeks:  1. Pt will be independent with HEP at D/C for self management. MET 9/5/17   2. Pt will demonstrate decreased edema in the left knee by 3cm in order to increase mobility for functional activities   3. Pt will demonstrate increased left knee mobility to WNLs in order to perform functional activities and bend more easily - progressing towards 8/25/2017   4. Pt will demonstrate increased strength in the left knee to WNLs in order to increase her ability to ambulate in the community and negotiate stairs with decreased complaints.      5.   Pt will be able to negotiate 12, 6 in stairs with reciprocal gait and unilateral railing in order to access the second floor of her home with increased ease.        PLAN  [x]  Upgrade activities as tolerated     []  Continue plan of care  []  Update interventions per flow sheet       []  Discharge due to:_  []  Other:_    Phil Mo PTA 9/8/2017  3:45 PM

## 2017-09-11 ENCOUNTER — HOSPITAL ENCOUNTER (OUTPATIENT)
Dept: PHYSICAL THERAPY | Age: 72
Discharge: HOME | End: 2017-09-11
Payer: COMMERCIAL

## 2017-09-11 PROCEDURE — 97016 VASOPNEUMATIC DEVICE THERAPY: CPT

## 2017-09-11 PROCEDURE — 97140 MANUAL THERAPY 1/> REGIONS: CPT

## 2017-09-11 PROCEDURE — 97110 THERAPEUTIC EXERCISES: CPT

## 2017-09-11 NOTE — PROGRESS NOTES
PT DAILY TREATMENT NOTE     Patient Name: Rodman Goodell  Date:2017  : 1945  [x]  Patient  Verified  Payor: Brianne Koenig / Plan: Caleb Albert / Product Type: HMO /    In time: 9:30  Out time: 10:45  Total Treatment Time (min): 75   Visit #: 5 of   Treatment Area: Unilateral primary osteoarthritis, left knee [M17.12]  Unspecified arthropathy, lower leg [M12.9]    SUBJECTIVE  Pain Level (0-10 scale):  achy  Any medication changes, allergies to medications, adverse drug reactions, diagnosis change, or new procedure performed?: [x] No    [] Yes (see summary sheet for update)  Subjective functional status/changes:   [] No changes reported    I just feel achy today     OBJECTIVE  Modality rationale: decrease edema, decrease inflammation and decrease pain to improve the patients ability to perform functional mobility and improve activity  endurance   Min Type Additional Details    [x] Estim: []Att   []Unatt        []TENS instruct                  []IFC  []Premod   [x]NMES                     []Other:  []w/US   [x]w/ice   []w/heat  Position:Supine with towel roll under heel for ext stretch.   Location: 10\"on:10\"off; SAQ    []  Traction: [] Cervical       []Lumbar                       [] Prone          []Supine                       []Intermittent   []Continuous Lbs:  [] before manual  [] after manual    []  Ultrasound: []Continuous   [] Pulsed                           []1MHz   []3MHz Location:  W/cm2:    []  Iontophoresis with dexamethasone         Location: [] Take home patch   [] In clinic    []  Ice     []  heat  []  Ice massage Position:   Location:   15+5 set up [x]  Vasopneumatic Device Pressure:       [x] lo [x] med [] hi   Temperature: [x] lo [] med [] hi   [x] Skin assessment post-treatment:  [x]intact []redness- no adverse reaction       []redness - adverse reaction:     40/30 min Therapeutic Exercise:  [x] See flow sheet :10 min NC for warm up and stretching    Rationale: increase ROM, increase strength and increase proprioception to improve the patients ability to ambulate I to perform ADLs and [facility] chores. 15 min Manual Therapy:  prone flex with distraction. PROM stretching in supine with patella mobs   Rationale: increase ROM and increase tissue extensibility to perform full AROM for transfers and ambulation of stairs          X min Patient Education: [x] Review HEP          Other Objective/Functional Measures:  Continue with POC towards remaining goals        Still presents with some difficulty with eccentric step downs and bringing the knee forward over the toes to progress with descending stairs - LTG#5            Pain Level (0-10 scale) post treatment: 0    ASSESSMENT/Changes in Function:     Patient will continue to benefit from skilled PT services to modify and progress therapeutic interventions, address functional mobility deficits, address ROM deficits, address strength deficits, analyze and address soft tissue restrictions, analyze and cue movement patterns, analyze and modify body mechanics/ergonomics, assess and modify postural abnormalities, address imbalance/dizziness and instruct in home and community integration to attain remaining goals. [x]  See Plan of Care  []  See progress note/recertification  []  See Discharge Summary         Progress towards goals / Updated goals:   New Goals to be achieved in __4__  weeks:  1. Pt will be independent with HEP at D/C for self management. MET 9/5/17   2. Pt will demonstrate decreased edema in the left knee by 3cm in order to increase mobility for functional activities   3. Pt will demonstrate increased left knee mobility to WNLs in order to perform functional activities and bend more easily - progressing towards 8/25/2017   4. Pt will demonstrate increased strength in the left knee to WNLs in order to increase her ability to ambulate in the community and negotiate stairs with decreased complaints.      5.   Pt will be able to negotiate 12, 6 in stairs with reciprocal gait and unilateral railing in order to access the second floor of her home with increased ease.  - progressing slowly 9/11/17        PLAN  [x]  Upgrade activities as tolerated     []  Continue plan of care  []  Update interventions per flow sheet       []  Discharge due to:_  []  Other:_    Godwin Camp, PTA 9/11/2017  3:45 PM

## 2017-09-13 ENCOUNTER — HOSPITAL ENCOUNTER (OUTPATIENT)
Dept: PHYSICAL THERAPY | Age: 72
Discharge: HOME | End: 2017-09-13
Payer: COMMERCIAL

## 2017-09-13 PROCEDURE — 97016 VASOPNEUMATIC DEVICE THERAPY: CPT

## 2017-09-13 PROCEDURE — 97140 MANUAL THERAPY 1/> REGIONS: CPT

## 2017-09-13 PROCEDURE — 97110 THERAPEUTIC EXERCISES: CPT

## 2017-09-13 NOTE — PROGRESS NOTES
PT DAILY TREATMENT NOTE     Patient Name: Cleopatra Murillo  OUVF:  : 1945  [x]  Patient  Verified  Payor: Elois Schlatter / Plan: Cecy Mask / Product Type: HMO /    In time: 9:30  Out time: 10:43  Total Treatment Time (min): 73   Visit #: 6 of   Treatment Area: Unilateral primary osteoarthritis, left knee [M17.12]  Unspecified arthropathy, lower leg [M12.9]    SUBJECTIVE  Pain Level (0-10 scale):  3/10  Any medication changes, allergies to medications, adverse drug reactions, diagnosis change, or new procedure performed?: [x] No    [] Yes (see summary sheet for update)  Subjective functional status/changes:   [] No changes reported    I am not a happy camper, I am having more pain today - I even had to take an Advil this morning to help with the pain and aching in the knee     OBJECTIVE  Modality rationale: decrease edema, decrease inflammation and decrease pain to improve the patients ability to perform functional mobility and improve activity  endurance   Min Type Additional Details    [x] Estim: []Att   []Unatt        []TENS instruct                  []IFC  []Premod   [x]NMES                     []Other:  []w/US   [x]w/ice   []w/heat  Position:Supine with towel roll under heel for ext stretch.   Location: 10\"on:10\"off; SAQ    []  Traction: [] Cervical       []Lumbar                       [] Prone          []Supine                       []Intermittent   []Continuous Lbs:  [] before manual  [] after manual    []  Ultrasound: []Continuous   [] Pulsed                           []1MHz   []3MHz Location:  W/cm2:    []  Iontophoresis with dexamethasone         Location: [] Take home patch   [] In clinic    []  Ice     []  heat  []  Ice massage Position:   Location:   15+5 set up [x]  Vasopneumatic Device Pressure:       [x] lo [x] med [] hi   Temperature: [x] lo [] med [] hi   [x] Skin assessment post-treatment:  [x]intact []redness- no adverse reaction       []redness - adverse reaction: 38/33 min Therapeutic Exercise:  [x] See flow sheet :5 min NC for warm up and stretching    Rationale: increase ROM, increase strength and increase proprioception to improve the patients ability to ambulate I to perform ADLs and [facility] chores. 15 min Manual Therapy:  prone flex with distraction. PROM stretching in supine with patella mobs   Rationale: increase ROM and increase tissue extensibility to perform full AROM for transfers and ambulation of stairs          X min Patient Education: [x] Review HEP          Other Objective/Functional Measures:  Able to achieve -2 with extension after extension hangs and manual         Need to increase quad/VMO strength and tone to assist with        maintaining full extension   Pain Level (0-10 scale) post treatment: 1/10    ASSESSMENT/Changes in Function:     Patient will continue to benefit from skilled PT services to modify and progress therapeutic interventions, address functional mobility deficits, address ROM deficits, address strength deficits, analyze and address soft tissue restrictions, analyze and cue movement patterns, analyze and modify body mechanics/ergonomics, assess and modify postural abnormalities, address imbalance/dizziness and instruct in home and community integration to attain remaining goals. [x]  See Plan of Care  []  See progress note/recertification  []  See Discharge Summary         Progress towards goals / Updated goals:   New Goals to be achieved in __4__  weeks:  1. Pt will be independent with HEP at D/C for self management. MET 9/5/17   2. Pt will demonstrate decreased edema in the left knee by 3cm in order to increase mobility for functional activities   3. Pt will demonstrate increased left knee mobility to WNLs in order to perform functional activities and bend more easily - progressing towards 8/25/2017   4.   Pt will demonstrate increased strength in the left knee to WNLs in order to increase her ability to ambulate in the community and negotiate stairs with decreased complaints.      5. Pt will be able to negotiate 12, 6 in stairs with reciprocal gait and unilateral railing in order to access the second floor of her home with increased ease.  - progressing slowly 9/11/17        PLAN  [x]  Upgrade activities as tolerated     []  Continue plan of care  []  Update interventions per flow sheet       []  Discharge due to:_  []  Other:_    Jey Guerra, PTA 9/13/2017  3:45 PM

## 2017-09-18 ENCOUNTER — HOSPITAL ENCOUNTER (OUTPATIENT)
Dept: PHYSICAL THERAPY | Age: 72
Discharge: HOME | End: 2017-09-18
Payer: COMMERCIAL

## 2017-09-18 PROCEDURE — 97110 THERAPEUTIC EXERCISES: CPT

## 2017-09-18 PROCEDURE — 97016 VASOPNEUMATIC DEVICE THERAPY: CPT

## 2017-09-18 PROCEDURE — 97140 MANUAL THERAPY 1/> REGIONS: CPT

## 2017-09-20 ENCOUNTER — HOSPITAL ENCOUNTER (OUTPATIENT)
Dept: PHYSICAL THERAPY | Age: 72
Discharge: HOME | End: 2017-09-20
Payer: COMMERCIAL

## 2017-09-20 PROCEDURE — 97140 MANUAL THERAPY 1/> REGIONS: CPT

## 2017-09-20 PROCEDURE — 97530 THERAPEUTIC ACTIVITIES: CPT

## 2017-09-20 PROCEDURE — 97016 VASOPNEUMATIC DEVICE THERAPY: CPT

## 2017-09-20 PROCEDURE — 97110 THERAPEUTIC EXERCISES: CPT

## 2017-09-20 NOTE — PROGRESS NOTES
[address] [facility] AT [facility]  [address] [facility] [address] [address],[address], Raghu castrejon, [place]  Phone [phone]  Fax  SUMMARY  Patient Name: Cleopatra Murillo : 1945   Treatment/Medical Diagnosis: Unilateral primary osteoarthritis, left knee [M17.12]  Unspecified arthropathy, lower leg [M12.9]   Referral Source: Demetria Flower MD     Date of Initial Visit: 17 Attended Visits: 20 Missed Visits: 0       Summary of Care: Thank you for referring this pleasant patient. Letitia Cuevas has completed 20 of 20 proposed sessions and has met or exceeded all of long term goals. She reports 75% improvement of symptoms with ADLs   Foto Score on evaluation was 38/100 and on discharge is 80/100 - indicating overall improvement. Patient is ready to assume I management of home exercise program to assist with keeping pain level down and decrease change of reoccurence of initial symptoms. 5/5 MMT in (L) LE and hip  0-110 AROM and 0-115 AAROM    Progress towards goals / Updated goals:   New Goals to be achieved in __4__  weeks:  1.  Pt will be independent with HEP at D/C for self management. MET 17   2.  Pt will demonstrate decreased edema in the left knee by 3cm in order to increase mobility for functional activities -MET 2017   3.  Pt will demonstrate increased left knee mobility to WNLs in order to perform functional activities and bend more easily --MET 2017   4. Pt will demonstrate increased strength in the left knee to WNLs in order to increase her ability to ambulate in the community and negotiate stairs with decreased complaints. -MET 2017       5. Pt will be able to negotiate 12, 6 in stairs with reciprocal gait and unilateral railing in order to access the second floor of her home with increased ease. - -MET 2017       Will advise patient that is any additional follow up or recommendation is needed to return to referring physician.     Reason for Discharge:  [] The patient was non-compliant with attendance. [] The patient could not be contacted to schedule further therapy sessions. [] The patient has been discharged based on the orders of the referring physician.  [] The patient has requested to be discharged due to:   [x] Patient has met all goals or max benefit has been achieved      If you have any questions/comments please contact us directly at [phone]. Thank you for allowing us to assist in the care of your patient.     Therapist Signature: Juan F Saravia PTA Date: 9/20/2017     Time: 10:37 AM

## 2017-09-20 NOTE — PROGRESS NOTES
PT DAILY TREATMENT NOTE     Patient Name: Rodman Goodell  Date:2017  : 1945  [x]  Patient  Verified  Payor: Brianne Koenig / Plan: Caleb Albert / Product Type: HMO /    In time: 9:35  Out time: 10:45  Total Treatment Time (min): 70   Visit #: 8 of   Treatment Area: Unilateral primary osteoarthritis, left knee [M17.12]  Unspecified arthropathy, lower leg [M12.9]    SUBJECTIVE  Pain Level (0-10 scale):  stiffness  Any medication changes, allergies to medications, adverse drug reactions, diagnosis change, or new procedure performed?: [x] No    [] Yes (see summary sheet for update)  Subjective functional status/changes:   [] No changes reported  I would say overall since I started therapy - I am 75% better - it is still stiff everyday - but I am getting around a lot better and doing more. I know that I need to continue with my exercises      OBJECTIVE  Modality rationale: decrease edema, decrease inflammation and decrease pain to improve the patients ability to perform functional mobility and improve activity  endurance   Min Type Additional Details    [x] Estim: []Att   []Unatt        []TENS instruct                  []IFC  []Premod   [x]NMES                     []Other:  []w/US   [x]w/ice   []w/heat  Position:Supine with towel roll under heel for ext stretch.   Location: 10\"on:10\"off; SAQ    []  Traction: [] Cervical       []Lumbar                       [] Prone          []Supine                       []Intermittent   []Continuous Lbs:  [] before manual  [] after manual    []  Ultrasound: []Continuous   [] Pulsed                           []1MHz   []3MHz Location:  W/cm2:    []  Iontophoresis with dexamethasone         Location: [] Take home patch   [] In clinic    []  Ice     []  heat  []  Ice massage Position:   Location:   15+5 set up [x]  Vasopneumatic Device Pressure:       [x] lo [x] med [] hi   Temperature: [x] lo [] med [] hi   [x] Skin assessment post-treatment:  [x]intact []redness- no adverse reaction       []redness - adverse reaction:     25 min Therapeutic Exercise:  [x] See flow sheet :5 min NC for warm up   Rationale: increase ROM, increase strength and increase proprioception to improve the patients ability to ambulate I to perform ADLs and [place] chores. 15 min Manual Therapy:  prone flex with distraction. PROM stretching in supine with patella mobs   Rationale: increase ROM and increase tissue extensibility to perform full AROM for transfers and ambulation of stairs          10 min Therapeutic Activities - reassessment and FOTO       Other Objective/Functional Measure: 5/5 MMT in (L) LE and hip        0- 110 AROM and 0-115 AAROM        FOTO score is 80/100         Pain Level (0-10 scale) post treatment: 0/10    ASSESSMENT/Changes in Function:     []  See Plan of Care  []  See progress note/recertification  [x]  See Discharge Summary         Progress towards goals / Updated goals:   New Goals to be achieved in __4__  weeks:  1. Pt will be independent with HEP at D/C for self management. MET 9/5/17   2. Pt will demonstrate decreased edema in the left knee by 3cm in order to increase mobility for functional activities -MET 9/20/2017   3. Pt will demonstrate increased left knee mobility to WNLs in order to perform functional activities and bend more easily --MET 9/20/2017   4. Pt will demonstrate increased strength in the left knee to WNLs in order to increase her ability to ambulate in the community and negotiate stairs with decreased complaints. -MET 9/20/2017      5. Pt will be able to negotiate 12, 6 in stairs with reciprocal gait and unilateral railing in order to access the second floor of her home with increased ease.  - -MET 9/20/2017        PLAN  []  Upgrade activities as tolerated     []  Continue plan of care  []  Update interventions per flow sheet       [x]  Discharge due to:_ACHIEVED ALL GOALS READY TO ASSUME I MANAGEMENT WITH HEP  []  Other:_    Anthony Bartlett CRISTINE Bobby 9/20/2017  3:45 PM

## 2022-03-20 PROBLEM — M17.12 OSTEOARTHRITIS OF LEFT KNEE: Status: ACTIVE | Noted: 2017-06-26

## (undated) DEVICE — SYSTEM SKIN CLSR 22CM DERMBND PRINEO

## (undated) DEVICE — SHEET,DRAPE,70X85,STERILE: Brand: MEDLINE

## (undated) DEVICE — SOL IRRIGATION INJ NACL 0.9% 500ML BTL

## (undated) DEVICE — GOWN,SIRUS,NONRNF,SETINSLV,2XL,18/CS: Brand: MEDLINE

## (undated) DEVICE — OSCILLATING TIP SAW CARTRIDGE: Brand: PRECISION FALCON

## (undated) DEVICE — (D)PREP SKN CHLRAPRP APPL 26ML -- CONVERT TO ITEM 371833

## (undated) DEVICE — ZIMMER® STERILE DISPOSABLE TOURNIQUET CUFF WITH PROTECTIVE SLEEVE AND PLC, SINGLE PORT, SINGLE BLADDER, 34 IN. (86 CM)

## (undated) DEVICE — SYR LR LCK 1ML GRAD NSAF 30ML --

## (undated) DEVICE — Device

## (undated) DEVICE — NEEDLE HYPO 21GA L1.5IN INTRAMUSCULAR S STL LATCH BVL UP

## (undated) DEVICE — SUTURE MCRYL SZ 2-0 L36IN ABSRB UD L36MM CT-1 1/2 CIR Y945H

## (undated) DEVICE — DRSG FOAM MEPILX PST OP 4X12IN --

## (undated) DEVICE — REM POLYHESIVE ADULT PATIENT RETURN ELECTRODE: Brand: VALLEYLAB

## (undated) DEVICE — GOWN,SIRUS,NONRNF,SETINSLV,XL,20/CS: Brand: MEDLINE

## (undated) DEVICE — PLUS HANDPIECE WITH SPRAY TIP: Brand: SURGILAV

## (undated) DEVICE — PACK PROCEDURE SURG TOT KNEE CUST

## (undated) DEVICE — CONCISE CEMENT SCULPS KIT: Brand: CONCISE

## (undated) DEVICE — KENDALL SCD EXPRESS FOOT CUFF, MEDIUM: Brand: KENDALL SCD

## (undated) DEVICE — SUTURE STRATAFIX SZ 1 L14IN ABSRB VLT L36MM MO-4 TAPERPOINT SXPD2B400

## (undated) DEVICE — INTENDED FOR TISSUE SEPARATION, AND OTHER PROCEDURES THAT REQUIRE A SHARP SURGICAL BLADE TO PUNCTURE OR CUT.: Brand: BARD-PARKER ® CARBON RIB-BACK BLADES

## (undated) DEVICE — SUT VCRL + 1 36IN CT1 VIO --

## (undated) DEVICE — BLADE ELECTRODE: Brand: EDGE

## (undated) DEVICE — DEVON™ KNEE AND BODY STRAP 60" X 3" (1.5 M X 7.6 CM): Brand: DEVON

## (undated) DEVICE — BOWL AND CEMENT CARTRIDGE WITH BREAKAWAY FEMORAL NOZZLE: Brand: ACM

## (undated) DEVICE — DRAPE TWL SURG 16X26IN BLU ORB04] ALLCARE INC]

## (undated) DEVICE — SOLUTION IRRIG 3000ML LAC R FLX CONT

## (undated) DEVICE — SUT MONOCRYL PLUS UD 3-0 --

## (undated) DEVICE — 3M™ COBAN™ NL STERILE NON-LATEX SELF-ADHERENT WRAP, 2086S, 6 IN X 5 YD (15 CM X 4,5 M), 12 ROLLS/CASE: Brand: 3M™ COBAN™

## (undated) DEVICE — SINGLE PORT MANIFOLD: Brand: NEPTUNE 2

## (undated) DEVICE — SOL IRR STRL H2O 1500ML BTL --

## (undated) DEVICE — NDL SPNE QNCKE 18GX3.5IN LF --

## (undated) DEVICE — STERILE LATEX POWDER-FREE SURGICAL GLOVESWITH NITRILE COATING: Brand: PROTEXIS

## (undated) DEVICE — BASIC SINGLE BASIN 1-LF: Brand: MEDLINE INDUSTRIES, INC.

## (undated) DEVICE — SHEET,DRAPE,40X58,STERILE: Brand: MEDLINE

## (undated) DEVICE — 3M™ STERI-DRAPE™ U-DRAPE 1015: Brand: STERI-DRAPE™

## (undated) DEVICE — STERILE TETRA-FLEX CF LF, 6IN X 11 YD: Brand: TETRA-FLEX™ CF

## (undated) DEVICE — 3M™ COBAN™ SELF-ADHERENT WRAP, 1586S, STERILE, 6 IN X 5 YD (15 CM X 4,5 M), 12 ROLLS/CASE: Brand: 3M™ COBAN™